# Patient Record
Sex: MALE | Race: OTHER | HISPANIC OR LATINO | URBAN - METROPOLITAN AREA
[De-identification: names, ages, dates, MRNs, and addresses within clinical notes are randomized per-mention and may not be internally consistent; named-entity substitution may affect disease eponyms.]

---

## 2017-04-01 ENCOUNTER — OUTPATIENT (OUTPATIENT)
Dept: OUTPATIENT SERVICES | Facility: HOSPITAL | Age: 52
LOS: 1 days | End: 2017-04-01
Payer: MEDICAID

## 2017-04-17 DIAGNOSIS — R69 ILLNESS, UNSPECIFIED: ICD-10-CM

## 2017-06-01 PROCEDURE — G9001: CPT

## 2017-08-11 ENCOUNTER — INPATIENT (INPATIENT)
Facility: HOSPITAL | Age: 52
LOS: 0 days | Discharge: HOME | End: 2017-08-12
Attending: HOSPITALIST | Admitting: HOSPITALIST
Payer: MEDICAID

## 2017-08-11 VITALS
HEART RATE: 99 BPM | HEIGHT: 70 IN | WEIGHT: 240.08 LBS | TEMPERATURE: 98 F | DIASTOLIC BLOOD PRESSURE: 78 MMHG | OXYGEN SATURATION: 99 % | SYSTOLIC BLOOD PRESSURE: 135 MMHG | RESPIRATION RATE: 16 BRPM

## 2017-08-11 DIAGNOSIS — E66.9 OBESITY, UNSPECIFIED: ICD-10-CM

## 2017-08-11 DIAGNOSIS — E11.9 TYPE 2 DIABETES MELLITUS WITHOUT COMPLICATIONS: ICD-10-CM

## 2017-08-11 DIAGNOSIS — I10 ESSENTIAL (PRIMARY) HYPERTENSION: ICD-10-CM

## 2017-08-11 DIAGNOSIS — E78.5 HYPERLIPIDEMIA, UNSPECIFIED: ICD-10-CM

## 2017-08-11 DIAGNOSIS — S92.309A FRACTURE OF UNSPECIFIED METATARSAL BONE(S), UNSPECIFIED FOOT, INITIAL ENCOUNTER FOR CLOSED FRACTURE: ICD-10-CM

## 2017-08-11 DIAGNOSIS — Z29.9 ENCOUNTER FOR PROPHYLACTIC MEASURES, UNSPECIFIED: ICD-10-CM

## 2017-08-11 LAB
ALBUMIN SERPL ELPH-MCNC: 3.6 G/DL — SIGNIFICANT CHANGE UP (ref 3.3–5)
ALP SERPL-CCNC: 106 U/L — SIGNIFICANT CHANGE UP (ref 40–120)
ALT FLD-CCNC: 48 U/L — SIGNIFICANT CHANGE UP (ref 12–78)
ANION GAP SERPL CALC-SCNC: 9 MMOL/L — SIGNIFICANT CHANGE UP (ref 5–17)
APTT BLD: 31.5 SEC — SIGNIFICANT CHANGE UP (ref 27.5–37.4)
AST SERPL-CCNC: 23 U/L — SIGNIFICANT CHANGE UP (ref 15–37)
BASOPHILS # BLD AUTO: 0.1 K/UL — SIGNIFICANT CHANGE UP (ref 0–0.2)
BASOPHILS NFR BLD AUTO: 1.1 % — SIGNIFICANT CHANGE UP (ref 0–2)
BILIRUB SERPL-MCNC: 0.7 MG/DL — SIGNIFICANT CHANGE UP (ref 0.2–1.2)
BLD GP AB SCN SERPL QL: SIGNIFICANT CHANGE UP
BUN SERPL-MCNC: 19 MG/DL — SIGNIFICANT CHANGE UP (ref 7–23)
CALCIUM SERPL-MCNC: 8.1 MG/DL — LOW (ref 8.5–10.1)
CHLORIDE SERPL-SCNC: 99 MMOL/L — SIGNIFICANT CHANGE UP (ref 96–108)
CO2 SERPL-SCNC: 25 MMOL/L — SIGNIFICANT CHANGE UP (ref 22–31)
CREAT SERPL-MCNC: 0.72 MG/DL — SIGNIFICANT CHANGE UP (ref 0.5–1.3)
EOSINOPHIL # BLD AUTO: 0.2 K/UL — SIGNIFICANT CHANGE UP (ref 0–0.5)
EOSINOPHIL NFR BLD AUTO: 3.3 % — SIGNIFICANT CHANGE UP (ref 0–6)
GLUCOSE SERPL-MCNC: 375 MG/DL — HIGH (ref 70–99)
HCT VFR BLD CALC: 45.2 % — SIGNIFICANT CHANGE UP (ref 39–50)
HGB BLD-MCNC: 15.6 G/DL — SIGNIFICANT CHANGE UP (ref 13–17)
INR BLD: 0.93 RATIO — SIGNIFICANT CHANGE UP (ref 0.88–1.16)
LYMPHOCYTES # BLD AUTO: 1.1 K/UL — SIGNIFICANT CHANGE UP (ref 1–3.3)
LYMPHOCYTES # BLD AUTO: 15.8 % — SIGNIFICANT CHANGE UP (ref 13–44)
MCHC RBC-ENTMCNC: 30.7 PG — SIGNIFICANT CHANGE UP (ref 27–34)
MCHC RBC-ENTMCNC: 34.6 GM/DL — SIGNIFICANT CHANGE UP (ref 32–36)
MCV RBC AUTO: 88.7 FL — SIGNIFICANT CHANGE UP (ref 80–100)
MONOCYTES # BLD AUTO: 0.5 K/UL — SIGNIFICANT CHANGE UP (ref 0–0.9)
MONOCYTES NFR BLD AUTO: 7.2 % — SIGNIFICANT CHANGE UP (ref 2–14)
NEUTROPHILS # BLD AUTO: 5.3 K/UL — SIGNIFICANT CHANGE UP (ref 1.8–7.4)
NEUTROPHILS NFR BLD AUTO: 72.6 % — SIGNIFICANT CHANGE UP (ref 43–77)
PLATELET # BLD AUTO: 175 K/UL — SIGNIFICANT CHANGE UP (ref 150–400)
POTASSIUM SERPL-MCNC: 4.4 MMOL/L — SIGNIFICANT CHANGE UP (ref 3.5–5.3)
POTASSIUM SERPL-SCNC: 4.4 MMOL/L — SIGNIFICANT CHANGE UP (ref 3.5–5.3)
PROT SERPL-MCNC: 7 GM/DL — SIGNIFICANT CHANGE UP (ref 6–8.3)
PROTHROM AB SERPL-ACNC: 10.1 SEC — SIGNIFICANT CHANGE UP (ref 9.8–12.7)
RBC # BLD: 5.09 M/UL — SIGNIFICANT CHANGE UP (ref 4.2–5.8)
RBC # FLD: 13.4 % — SIGNIFICANT CHANGE UP (ref 11–15)
SODIUM SERPL-SCNC: 133 MMOL/L — LOW (ref 135–145)
WBC # BLD: 7.2 K/UL — SIGNIFICANT CHANGE UP (ref 3.8–10.5)
WBC # FLD AUTO: 7.2 K/UL — SIGNIFICANT CHANGE UP (ref 3.8–10.5)

## 2017-08-11 PROCEDURE — 99223 1ST HOSP IP/OBS HIGH 75: CPT

## 2017-08-11 PROCEDURE — 99284 EMERGENCY DEPT VISIT MOD MDM: CPT | Mod: 25

## 2017-08-11 PROCEDURE — 93010 ELECTROCARDIOGRAM REPORT: CPT

## 2017-08-11 PROCEDURE — 71010: CPT | Mod: 26

## 2017-08-11 PROCEDURE — 73630 X-RAY EXAM OF FOOT: CPT | Mod: 26,RT

## 2017-08-11 RX ORDER — DEXTROSE 50 % IN WATER 50 %
25 SYRINGE (ML) INTRAVENOUS ONCE
Qty: 0 | Refills: 0 | Status: DISCONTINUED | OUTPATIENT
Start: 2017-08-11 | End: 2017-08-11

## 2017-08-11 RX ORDER — DEXTROSE 50 % IN WATER 50 %
25 SYRINGE (ML) INTRAVENOUS ONCE
Qty: 0 | Refills: 0 | Status: DISCONTINUED | OUTPATIENT
Start: 2017-08-11 | End: 2017-08-12

## 2017-08-11 RX ORDER — DEXTROSE 50 % IN WATER 50 %
12.5 SYRINGE (ML) INTRAVENOUS ONCE
Qty: 0 | Refills: 0 | Status: DISCONTINUED | OUTPATIENT
Start: 2017-08-11 | End: 2017-08-12

## 2017-08-11 RX ORDER — OXYCODONE AND ACETAMINOPHEN 5; 325 MG/1; MG/1
2 TABLET ORAL EVERY 4 HOURS
Qty: 0 | Refills: 0 | Status: DISCONTINUED | OUTPATIENT
Start: 2017-08-11 | End: 2017-08-12

## 2017-08-11 RX ORDER — GLUCAGON INJECTION, SOLUTION 0.5 MG/.1ML
1 INJECTION, SOLUTION SUBCUTANEOUS ONCE
Qty: 0 | Refills: 0 | Status: DISCONTINUED | OUTPATIENT
Start: 2017-08-11 | End: 2017-08-11

## 2017-08-11 RX ORDER — SODIUM CHLORIDE 9 MG/ML
1000 INJECTION, SOLUTION INTRAVENOUS
Qty: 0 | Refills: 0 | Status: DISCONTINUED | OUTPATIENT
Start: 2017-08-11 | End: 2017-08-11

## 2017-08-11 RX ORDER — HEPARIN SODIUM 5000 [USP'U]/ML
5000 INJECTION INTRAVENOUS; SUBCUTANEOUS EVERY 12 HOURS
Qty: 0 | Refills: 0 | Status: DISCONTINUED | OUTPATIENT
Start: 2017-08-11 | End: 2017-08-12

## 2017-08-11 RX ORDER — DEXTROSE 50 % IN WATER 50 %
1 SYRINGE (ML) INTRAVENOUS ONCE
Qty: 0 | Refills: 0 | Status: DISCONTINUED | OUTPATIENT
Start: 2017-08-11 | End: 2017-08-12

## 2017-08-11 RX ORDER — SODIUM CHLORIDE 9 MG/ML
1000 INJECTION, SOLUTION INTRAVENOUS
Qty: 0 | Refills: 0 | Status: DISCONTINUED | OUTPATIENT
Start: 2017-08-11 | End: 2017-08-12

## 2017-08-11 RX ORDER — CEFAZOLIN SODIUM 1 G
1000 VIAL (EA) INJECTION EVERY 8 HOURS
Qty: 0 | Refills: 0 | Status: DISCONTINUED | OUTPATIENT
Start: 2017-08-11 | End: 2017-08-12

## 2017-08-11 RX ORDER — DEXTROSE 50 % IN WATER 50 %
12.5 SYRINGE (ML) INTRAVENOUS ONCE
Qty: 0 | Refills: 0 | Status: DISCONTINUED | OUTPATIENT
Start: 2017-08-11 | End: 2017-08-11

## 2017-08-11 RX ORDER — OXYCODONE AND ACETAMINOPHEN 5; 325 MG/1; MG/1
2 TABLET ORAL EVERY 4 HOURS
Qty: 0 | Refills: 0 | Status: DISCONTINUED | OUTPATIENT
Start: 2017-08-11 | End: 2017-08-11

## 2017-08-11 RX ORDER — METFORMIN HYDROCHLORIDE 850 MG/1
1 TABLET ORAL
Qty: 0 | Refills: 0 | COMMUNITY

## 2017-08-11 RX ORDER — FENTANYL CITRATE 50 UG/ML
25 INJECTION INTRAVENOUS
Qty: 0 | Refills: 0 | Status: DISCONTINUED | OUTPATIENT
Start: 2017-08-11 | End: 2017-08-11

## 2017-08-11 RX ORDER — GLUCAGON INJECTION, SOLUTION 0.5 MG/.1ML
1 INJECTION, SOLUTION SUBCUTANEOUS ONCE
Qty: 0 | Refills: 0 | Status: DISCONTINUED | OUTPATIENT
Start: 2017-08-11 | End: 2017-08-12

## 2017-08-11 RX ORDER — HEPARIN SODIUM 5000 [USP'U]/ML
5000 INJECTION INTRAVENOUS; SUBCUTANEOUS EVERY 12 HOURS
Qty: 0 | Refills: 0 | Status: DISCONTINUED | OUTPATIENT
Start: 2017-08-11 | End: 2017-08-11

## 2017-08-11 RX ORDER — ACETAMINOPHEN 500 MG
1000 TABLET ORAL ONCE
Qty: 0 | Refills: 0 | Status: COMPLETED | OUTPATIENT
Start: 2017-08-11 | End: 2017-08-11

## 2017-08-11 RX ORDER — SODIUM CHLORIDE 9 MG/ML
1000 INJECTION INTRAMUSCULAR; INTRAVENOUS; SUBCUTANEOUS
Qty: 0 | Refills: 0 | Status: DISCONTINUED | OUTPATIENT
Start: 2017-08-11 | End: 2017-08-11

## 2017-08-11 RX ORDER — GEMFIBROZIL 600 MG
0 TABLET ORAL
Qty: 0 | Refills: 0 | COMMUNITY

## 2017-08-11 RX ORDER — DEXTROSE 50 % IN WATER 50 %
1 SYRINGE (ML) INTRAVENOUS ONCE
Qty: 0 | Refills: 0 | Status: DISCONTINUED | OUTPATIENT
Start: 2017-08-11 | End: 2017-08-11

## 2017-08-11 RX ADMIN — Medication 1000 MILLIGRAM(S): at 17:55

## 2017-08-11 RX ADMIN — Medication 400 MILLIGRAM(S): at 17:53

## 2017-08-11 RX ADMIN — OXYCODONE AND ACETAMINOPHEN 2 TABLET(S): 5; 325 TABLET ORAL at 12:16

## 2017-08-11 RX ADMIN — OXYCODONE AND ACETAMINOPHEN 2 TABLET(S): 5; 325 TABLET ORAL at 11:21

## 2017-08-11 RX ADMIN — OXYCODONE AND ACETAMINOPHEN 2 TABLET(S): 5; 325 TABLET ORAL at 20:33

## 2017-08-11 RX ADMIN — SODIUM CHLORIDE 75 MILLILITER(S): 9 INJECTION, SOLUTION INTRAVENOUS at 17:53

## 2017-08-11 RX ADMIN — OXYCODONE AND ACETAMINOPHEN 2 TABLET(S): 5; 325 TABLET ORAL at 21:33

## 2017-08-11 RX ADMIN — Medication 100 MILLIGRAM(S): at 21:33

## 2017-08-11 RX ADMIN — SODIUM CHLORIDE 100 MILLILITER(S): 9 INJECTION INTRAMUSCULAR; INTRAVENOUS; SUBCUTANEOUS at 11:27

## 2017-08-11 NOTE — ED ADULT NURSE NOTE - OBJECTIVE STATEMENT
Referred to the emergency department for possible surgery Injury at work heavy metal fell on his foot 2 days ago Fracturing 2nd and 3rd toe on the right foot. Bruising to the top of the right foot. He has a history of DM, and high cholesterol. He is on medications  which he took this am. He had breakfast

## 2017-08-11 NOTE — ED PROVIDER NOTE - MEDICAL DECISION MAKING DETAILS
R foot pain after heavy object fell on it; R foot xray, pain control, reassess R foot pain after heavy object fell on it; R foot xray, pain control, reassess  seen by Dr. palacios(podiatry) in the ED, plan to take him to the OR.  pre-op labs sent, admit to hospitalist team

## 2017-08-11 NOTE — H&P ADULT - ASSESSMENT
50 y/o male  with oral medication controlled type 2 diabetes, HTN, and Obesity presents with right foot  second and third metatarsal fractures To OR for repair     Medically clear for procedure no obvious risk factors for a low risk procedure

## 2017-08-11 NOTE — H&P ADULT - NSHPLABSRESULTS_GEN_ALL_CORE
15.6   7.2   )-----------( 175      ( 11 Aug 2017 08:30 )             45.2     08-11    133<L>  |  99  |  19  ----------------------------<  375<H>  4.4   |  25  |  0.72    Ca    8.1<L>      11 Aug 2017 08:30    TPro  7.0  /  Alb  3.6  /  TBili  0.7  /  DBili  x   /  AST  23  /  ALT  48  /  AlkPhos  106  08-11    PT/INR - ( 11 Aug 2017 08:30 )   PT: 10.1 sec;   INR: 0.93 ratio         PTT - ( 11 Aug 2017 08:30 )  PTT:31.5 sec    < from: Xray Foot AP + Lateral + Oblique, Right (08.11.17 @ 08:42) >      EXAM:  FOOT COMPLETE  3 VWS  RT                            PROCEDURE DATE:  08/11/2017          INTERPRETATION:  PROCEDURE: AP, lateral, and oblique views of the right   foot.    CLINICAL INFORMATION: Right foot pain.    No prior studies are available for comparison.    FINDINGS:    There are oblique fractures through the distal aspects of the right   second and third metatarsals. The Lisfranc alignment is intact. There is   no dislocation.    IMPRESSION:    Right second and third metatarsal fractures.    < end of copied text >

## 2017-08-11 NOTE — H&P ADULT - PROBLEM SELECTOR PLAN 1
Appreciate podiatry management   -check ekg and CXR   -NPO an IV fluids   -To OR  -pain control with percocet   -PT post op

## 2017-08-11 NOTE — H&P ADULT - PMH
Essential hypertension    Hyperlipidemia, unspecified hyperlipidemia type    Obesity (BMI 30.0-34.9)    Type 2 diabetes mellitus without complication, without long-term current use of insulin

## 2017-08-11 NOTE — ED PROVIDER NOTE - PHYSICAL EXAMINATION
Gen: well appearing, in no distress  HEENT: no evidence of head trauma  CV: RRR; no m/g/r  lungs; CTAB: no w/r/r  abd: soft, nd, nt  ext: 2+ DP/PT pulses in RLE; + ttp of R mid foot; nl sensation and motor;

## 2017-08-11 NOTE — ED ADULT TRIAGE NOTE - CHIEF COMPLAINT QUOTE
sent DR Ochoa for fracture to 2nd and 3rd metatarsal of right foot, due from injury yesterday. Possible surgery today.

## 2017-08-11 NOTE — H&P ADULT - HISTORY OF PRESENT ILLNESS
· HPI Objective Statement: Pertinent PMH/PSH/FHx/SHx and Review of Systems contained within:  52 y/o M w hx of DM, presents w R foot pain; reports a heavy object fell on his R foot while at work 2 days ago, went to see his PMD yesterday, and referred to ED due to metatarsal fracture; patient able to ambulate; denies any other injuries or complaints on ROS  PMH: DM meds: metformin; gemfibrozil; januvia; lyrica; enalapril allergies: nkda SH: current smooker cig or drug use

## 2017-08-12 ENCOUNTER — TRANSCRIPTION ENCOUNTER (OUTPATIENT)
Age: 52
End: 2017-08-12

## 2017-08-12 VITALS
RESPIRATION RATE: 17 BRPM | SYSTOLIC BLOOD PRESSURE: 142 MMHG | HEART RATE: 84 BPM | DIASTOLIC BLOOD PRESSURE: 84 MMHG | TEMPERATURE: 98 F | OXYGEN SATURATION: 98 %

## 2017-08-12 LAB
ALBUMIN SERPL ELPH-MCNC: 3.2 G/DL — LOW (ref 3.3–5)
ALP SERPL-CCNC: 85 U/L — SIGNIFICANT CHANGE UP (ref 40–120)
ALT FLD-CCNC: 41 U/L — SIGNIFICANT CHANGE UP (ref 12–78)
ANION GAP SERPL CALC-SCNC: 8 MMOL/L — SIGNIFICANT CHANGE UP (ref 5–17)
AST SERPL-CCNC: 25 U/L — SIGNIFICANT CHANGE UP (ref 15–37)
BILIRUB SERPL-MCNC: 0.5 MG/DL — SIGNIFICANT CHANGE UP (ref 0.2–1.2)
BUN SERPL-MCNC: 11 MG/DL — SIGNIFICANT CHANGE UP (ref 7–23)
CALCIUM SERPL-MCNC: 7.8 MG/DL — LOW (ref 8.5–10.1)
CHLORIDE SERPL-SCNC: 102 MMOL/L — SIGNIFICANT CHANGE UP (ref 96–108)
CHOLEST SERPL-MCNC: 155 MG/DL — SIGNIFICANT CHANGE UP (ref 10–199)
CO2 SERPL-SCNC: 27 MMOL/L — SIGNIFICANT CHANGE UP (ref 22–31)
CREAT SERPL-MCNC: 0.44 MG/DL — LOW (ref 0.5–1.3)
GLUCOSE SERPL-MCNC: 223 MG/DL — HIGH (ref 70–99)
HBA1C BLD-MCNC: 11.7 % — HIGH (ref 4–5.6)
HCT VFR BLD CALC: 42.9 % — SIGNIFICANT CHANGE UP (ref 39–50)
HDLC SERPL-MCNC: 18 MG/DL — LOW (ref 40–125)
HGB BLD-MCNC: 14.1 G/DL — SIGNIFICANT CHANGE UP (ref 13–17)
LIPID PNL WITH DIRECT LDL SERPL: SIGNIFICANT CHANGE UP
MCHC RBC-ENTMCNC: 29 PG — SIGNIFICANT CHANGE UP (ref 27–34)
MCHC RBC-ENTMCNC: 32.8 GM/DL — SIGNIFICANT CHANGE UP (ref 32–36)
MCV RBC AUTO: 88.2 FL — SIGNIFICANT CHANGE UP (ref 80–100)
PLATELET # BLD AUTO: 164 K/UL — SIGNIFICANT CHANGE UP (ref 150–400)
POTASSIUM SERPL-MCNC: 3.8 MMOL/L — SIGNIFICANT CHANGE UP (ref 3.5–5.3)
POTASSIUM SERPL-SCNC: 3.8 MMOL/L — SIGNIFICANT CHANGE UP (ref 3.5–5.3)
PROT SERPL-MCNC: 6.5 GM/DL — SIGNIFICANT CHANGE UP (ref 6–8.3)
RBC # BLD: 4.87 M/UL — SIGNIFICANT CHANGE UP (ref 4.2–5.8)
RBC # FLD: 13 % — SIGNIFICANT CHANGE UP (ref 11–15)
SODIUM SERPL-SCNC: 137 MMOL/L — SIGNIFICANT CHANGE UP (ref 135–145)
TOTAL CHOLESTEROL/HDL RATIO MEASUREMENT: 8.6 RATIO — SIGNIFICANT CHANGE UP (ref 3.4–9.6)
TRIGL SERPL-MCNC: 788 MG/DL — HIGH (ref 10–149)
WBC # BLD: 7.8 K/UL — SIGNIFICANT CHANGE UP (ref 3.8–10.5)
WBC # FLD AUTO: 7.8 K/UL — SIGNIFICANT CHANGE UP (ref 3.8–10.5)

## 2017-08-12 PROCEDURE — 73630 X-RAY EXAM OF FOOT: CPT | Mod: 26,RT

## 2017-08-12 PROCEDURE — 99239 HOSP IP/OBS DSCHRG MGMT >30: CPT

## 2017-08-12 RX ORDER — ENOXAPARIN SODIUM 100 MG/ML
40 INJECTION SUBCUTANEOUS
Qty: 14 | Refills: 0 | OUTPATIENT
Start: 2017-08-12 | End: 2017-08-26

## 2017-08-12 RX ORDER — OXYCODONE HYDROCHLORIDE 5 MG/1
2 TABLET ORAL
Qty: 40 | Refills: 0 | OUTPATIENT
Start: 2017-08-12 | End: 2017-08-17

## 2017-08-12 RX ADMIN — HEPARIN SODIUM 5000 UNIT(S): 5000 INJECTION INTRAVENOUS; SUBCUTANEOUS at 05:55

## 2017-08-12 RX ADMIN — OXYCODONE AND ACETAMINOPHEN 2 TABLET(S): 5; 325 TABLET ORAL at 06:54

## 2017-08-12 RX ADMIN — Medication 100 MILLIGRAM(S): at 05:55

## 2017-08-12 RX ADMIN — OXYCODONE AND ACETAMINOPHEN 2 TABLET(S): 5; 325 TABLET ORAL at 05:54

## 2017-08-12 NOTE — PHYSICAL THERAPY INITIAL EVALUATION ADULT - ADDITIONAL COMMENTS
As per pt he lives in a private house with his wife. Three stairs to enter the home without handrails, and 13 steps inside with Right ascending handrails. Pt prior was I without AD

## 2017-08-12 NOTE — DISCHARGE NOTE ADULT - PATIENT PORTAL LINK FT
“You can access the [facility] Patient Portal, offered by [facility], by registering with the following website: [email or web address]”

## 2017-08-12 NOTE — PHYSICAL THERAPY INITIAL EVALUATION ADULT - GAIT DEVIATIONS NOTED, PT EVAL
decreased step length/decreased weight-shifting ability/decreased stride length/increased time in double stance/decreased bernie

## 2017-08-12 NOTE — PROGRESS NOTE ADULT - ASSESSMENT
52 yo M s/p RF ORIF 2nd and 3rd metatarsals  -Pain controlled with percocet, cont using outpt  -keep cast clean, dry, and intact  -strict NWB to R foot  -No need for outpt abx  -Rec lovenox 40 mg once daily for 14 days on discharge  -pod stable for dc once PT evaluates pt  -d/w attending

## 2017-08-12 NOTE — DISCHARGE NOTE ADULT - MEDICATION SUMMARY - MEDICATIONS TO TAKE
I will START or STAY ON the medications listed below when I get home from the hospital:    physical therapy   -- Patient to have physical therapy 2-3 x aweek right foot fracture   -- Indication: For To get stronger    acetaminophen-oxycodone 325 mg-5 mg oral tablet  -- 2 tab(s) by mouth every 6 hours, As Needed, Moderate Pain (4 - 6) MDD:8 tablets daily  -- Indication: For Pain    enoxaparin 40 mg/0.4 mL injectable solution  -- 40 milligram(s) injectable once a day  -- It is very important that you take or use this exactly as directed.  Do not skip doses or discontinue unless directed by your doctor.    -- Indication: For To stop blood clots     metFORMIN 500 mg oral tablet  -- 1 tab(s) by mouth 2 times a day  -- Indication: For Type 2 diabetes mellitus without complication, without long-term current use of insulin    Januvia  --  by mouth once a day  -- Indication: For Type 2 diabetes mellitus without complication, without long-term current use of insulin    pravastatin  --  by mouth once a day  -- Indication: For cholesterol    gemfibrozil 600 mg oral tablet  --  by mouth once a day  -- Indication: For cholesterol

## 2017-08-12 NOTE — DISCHARGE NOTE ADULT - CARE PLAN
Principal Discharge DX:	Metatarsal bone fracture  Goal:	please follow e up with podiatry and your  PMD  Instructions for follow-up, activity and diet:	please  follow up with podiatry

## 2017-08-12 NOTE — DISCHARGE NOTE ADULT - HOSPITAL COURSE
Chief Complaint/Reason for Admission: right foot pain	  History of Present Illness: 	  · HPI Objective Statement: Pertinent PMH/PSH/FHx/SHx and Review of Systems contained within:  52 y/o M w hx of DM, presents w R foot pain; reports a heavy object fell on his R foot while at work 2 days ago, went to see his PMD yesterday, and referred to ED due to metatarsal fracture; patient able to ambulate; denies any other injuries or complaints on ROS  PMH: DM meds: metformin; gemfibrozil; januvia; lyrica; enalapril allergies: nkda SH: current smooker cig or drug use	    < from: Xray Foot AP + Lateral + Oblique, Right (08.12.17 @ 08:32) >  EXAM:  FOOT COMPLETE  3 VWS  RT                            PROCEDURE DATE:  08/12/2017          INTERPRETATION:  Right foot    Indication: Postop foot surgery, assess alignment    Comparison: 8/11    IMPRESSION: 2 views demonstrate baseline exam status post sideplate and   screw fixation of the second and third metatarsal osteotomies in good   alignment in cast.      < end of copied text >    RADIOLOGY & ADDITIONAL TESTS:      Assessment and Plan:   · Assessment		  52 yo M s/p RF ORIF 2nd and 3rd metatarsals  -Pain controlled with percocet, cont using outpt  -keep cast clean, dry, and intact  -strict NWB to R foot  -No need for outpt abx  -Rec lovenox 40 mg once daily for 14 days on discharge  -pod stable for dc once PT evaluates pt  -d/w attending

## 2017-08-12 NOTE — DISCHARGE NOTE ADULT - CARE PROVIDER_API CALL
Fidencio Pugh (DPKRISTY), Surgery  [address] [place] [address]  [address]  [place] [address]  Phone: [phone]  Fax: [phone]

## 2017-08-12 NOTE — PHYSICAL THERAPY INITIAL EVALUATION ADULT - CRITERIA FOR SKILLED THERAPEUTIC INTERVENTIONS
functional limitations in following categories/impairments found/anticipated discharge recommendation/anticipated equipment needs at discharge

## 2017-08-12 NOTE — PHYSICAL THERAPY INITIAL EVALUATION ADULT - MODIFIED CLINICAL TEST OF SENSORY INTEGRATION IN BALANCE TEST
Barthel Index: Feeding Score _10__, Bathing Score _0__, Grooming Score __5_, Dressing Score _10__, Bowels Score _10__, Bladder Score __10_, Toilet Score _10__, Transfers Score _10__, Mobility Score _0__, Stairs Score __5_,     Total Score _70__

## 2017-08-18 ENCOUNTER — TRANSCRIPTION ENCOUNTER (OUTPATIENT)
Age: 52
End: 2017-08-18
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
## 2017-08-21 DIAGNOSIS — E78.5 HYPERLIPIDEMIA, UNSPECIFIED: ICD-10-CM

## 2017-08-21 DIAGNOSIS — E11.9 TYPE 2 DIABETES MELLITUS WITHOUT COMPLICATIONS: ICD-10-CM

## 2017-08-21 DIAGNOSIS — S92.321A DISPLACED FRACTURE OF SECOND METATARSAL BONE, RIGHT FOOT, INITIAL ENCOUNTER FOR CLOSED FRACTURE: ICD-10-CM
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
## 2017-08-21 DIAGNOSIS — Z79.84 LONG TERM (CURRENT) USE OF ORAL HYPOGLYCEMIC DRUGS: ICD-10-CM

## 2017-08-21 DIAGNOSIS — E66.9 OBESITY, UNSPECIFIED: ICD-10-CM

## 2017-08-21 DIAGNOSIS — S92.331A DISPLACED FRACTURE OF THIRD METATARSAL BONE, RIGHT FOOT, INITIAL ENCOUNTER FOR CLOSED FRACTURE: ICD-10-CM

## 2017-08-21 DIAGNOSIS — F17.210 NICOTINE DEPENDENCE, CIGARETTES, UNCOMPLICATED: ICD-10-CM

## 2017-08-22 ENCOUNTER — APPOINTMENT (OUTPATIENT)
Dept: INTERNAL MEDICINE | Facility: CLINIC | Age: 52
End: 2017-08-22

## 2017-09-03 ENCOUNTER — EMERGENCY (EMERGENCY)
Facility: HOSPITAL | Age: 52
LOS: 0 days | Discharge: LEFT BEFORE DISCHARGE | End: 2017-09-03
Attending: EMERGENCY MEDICINE
Payer: MEDICAID

## 2017-09-03 VITALS
TEMPERATURE: 98 F | OXYGEN SATURATION: 98 % | DIASTOLIC BLOOD PRESSURE: 73 MMHG | SYSTOLIC BLOOD PRESSURE: 151 MMHG | HEIGHT: 70 IN | RESPIRATION RATE: 18 BRPM | WEIGHT: 240.08 LBS | HEART RATE: 106 BPM

## 2017-09-03 DIAGNOSIS — Z48.02 ENCOUNTER FOR REMOVAL OF SUTURES: ICD-10-CM

## 2017-09-03 DIAGNOSIS — Z98.890 OTHER SPECIFIED POSTPROCEDURAL STATES: Chronic | ICD-10-CM

## 2017-09-03 PROCEDURE — 99282 EMERGENCY DEPT VISIT SF MDM: CPT

## 2017-09-03 NOTE — ED PROVIDER NOTE - PRESENTING SYMPTOMS DETAILS
51M recent ORIF of R 2nd/3rd metatarsal about a month ago  comes in for suture removal. states missed his appt. no fever/chills/obvious discharge, no injuries  ROS: No fever/chills, No photophobia/eye pain/changes in vision, No ear pain/sore throat/dysphagia, No chest pain/palpitations, no SOB/cough/wheeze/stridor, No abdominal pain/N/V/D, no dysuria/frequency/discharge, No neck/back pain, no rash, no changes in neurological status/function.

## 2017-09-03 NOTE — ED PROVIDER NOTE - PHYSICAL EXAMINATION
GEN: Alert, NAD  HEAD: atraumatic, EOMI, PERRL, normal lids/conjunctiva  ENT: normal hearing, patent oropharynx without erythema/exudate, uvula midline  NECK: +supple, no tenderness/meningismus, +Trachea midline  PULM: Bilateral BS, normal resp effort, no wheeze/stridor/retractions  CV: RRR, no M/R/G, +dist ext pulses  ABD: soft, NT/ND  BACK: no CVAT, no midline tenderness  MSK: no edema/erythema/cyanosis  SKIN: no rash  NEURO: AAOx3, no sensory/motor deficits, CN 2-12 intact  R foot in cast, no obvious erythema seen around his toes  sutures under his cast

## 2017-09-03 NOTE — ED ADULT NURSE NOTE - CARDIO ASSESSMENT
Post stent cardiac rehab education completed with patient. Stent card given. Patient to go to [facility] cardiac rehab.   Contact information placed in AVS.
---

## 2017-09-03 NOTE — ED PROVIDER NOTE - MEDICAL DECISION MAKING DETAILS
pt states he feels impatient and doesn't want to wait for podiatry to call back. recommend f/u w his surgeon. pt appears well and non-toxic. . VSS. Sx have improved during ED stay. No acute events during ED observation period. Precautions given to return to the ED if sx persist or change. Pt expresses understanding and has no further questions. Pt feels comfortable and wishes to be discharged home. Instructed to follow up with PMD in 24 hrs. podiatry
Never

## 2017-10-03 ENCOUNTER — APPOINTMENT (OUTPATIENT)
Dept: INTERNAL MEDICINE | Facility: CLINIC | Age: 52
End: 2017-10-03

## 2017-10-31 ENCOUNTER — APPOINTMENT (OUTPATIENT)
Dept: INTERNAL MEDICINE | Facility: CLINIC | Age: 52
End: 2017-10-31
Payer: MEDICAID

## 2017-10-31 VITALS — SYSTOLIC BLOOD PRESSURE: 106 MMHG | RESPIRATION RATE: 14 BRPM | HEART RATE: 112 BPM | DIASTOLIC BLOOD PRESSURE: 70 MMHG

## 2017-10-31 VITALS — BODY MASS INDEX: 35.07 KG/M2 | HEIGHT: 70 IN | WEIGHT: 245 LBS

## 2017-10-31 DIAGNOSIS — Z72.3 LACK OF PHYSICAL EXERCISE: ICD-10-CM

## 2017-10-31 DIAGNOSIS — Z83.3 FAMILY HISTORY OF DIABETES MELLITUS: ICD-10-CM

## 2017-10-31 DIAGNOSIS — F15.90 OTHER STIMULANT USE, UNSPECIFIED, UNCOMPLICATED: ICD-10-CM

## 2017-10-31 PROCEDURE — 36415 COLL VENOUS BLD VENIPUNCTURE: CPT

## 2017-10-31 PROCEDURE — 99406 BEHAV CHNG SMOKING 3-10 MIN: CPT

## 2017-10-31 PROCEDURE — 93000 ELECTROCARDIOGRAM COMPLETE: CPT

## 2017-10-31 PROCEDURE — 99204 OFFICE O/P NEW MOD 45 MIN: CPT | Mod: 25

## 2017-11-02 DIAGNOSIS — Z00.00 ENCOUNTER FOR GENERAL ADULT MEDICAL EXAMINATION W/OUT ABNORMAL FINDINGS: ICD-10-CM

## 2017-11-02 LAB
ALBUMIN SERPL ELPH-MCNC: 4.3 G/DL
ALP BLD-CCNC: 125 U/L
ALT SERPL-CCNC: 24 U/L
ANION GAP SERPL CALC-SCNC: 16 MMOL/L
APPEARANCE: CLEAR
AST SERPL-CCNC: 24 U/L
BASOPHILS # BLD AUTO: 0.04 K/UL
BASOPHILS NFR BLD AUTO: 0.6 %
BILIRUB SERPL-MCNC: 0.4 MG/DL
BILIRUBIN URINE: NEGATIVE
BLOOD URINE: NEGATIVE
BUN SERPL-MCNC: 16 MG/DL
CALCIUM SERPL-MCNC: 9.8 MG/DL
CHLORIDE SERPL-SCNC: 92 MMOL/L
CHOLEST SERPL-MCNC: 171 MG/DL
CHOLEST/HDLC SERPL: 12.2 RATIO
CO2 SERPL-SCNC: 24 MMOL/L
COLOR: YELLOW
CREAT SERPL-MCNC: 0.96 MG/DL
CREAT SPEC-SCNC: 70 MG/DL
EOSINOPHIL # BLD AUTO: 0.2 K/UL
EOSINOPHIL NFR BLD AUTO: 2.9 %
GLUCOSE QUALITATIVE U: 1000 MG/DL
GLUCOSE SERPL-MCNC: 467 MG/DL
HBA1C MFR BLD HPLC: 10.5 %
HCT VFR BLD CALC: 44.1 %
HDLC SERPL-MCNC: 14 MG/DL
HGB BLD-MCNC: 15 G/DL
IMM GRANULOCYTES NFR BLD AUTO: 0.3 %
KETONES URINE: NEGATIVE
LDLC SERPL CALC-MCNC: NORMAL
LEUKOCYTE ESTERASE URINE: NEGATIVE
LYMPHOCYTES # BLD AUTO: 1.23 K/UL
LYMPHOCYTES NFR BLD AUTO: 17.6 %
MAN DIFF?: NORMAL
MCHC RBC-ENTMCNC: 28.7 PG
MCHC RBC-ENTMCNC: 34 GM/DL
MCV RBC AUTO: 84.5 FL
MICROALBUMIN 24H UR DL<=1MG/L-MCNC: 5 MG/DL
MICROALBUMIN/CREAT 24H UR-RTO: 71 MG/G
MONOCYTES # BLD AUTO: 0.49 K/UL
MONOCYTES NFR BLD AUTO: 7 %
NEUTROPHILS # BLD AUTO: 5 K/UL
NEUTROPHILS NFR BLD AUTO: 71.6 %
NITRITE URINE: NEGATIVE
PH URINE: 5
PLATELET # BLD AUTO: 274 K/UL
POTASSIUM SERPL-SCNC: 4.9 MMOL/L
PROT SERPL-MCNC: 7.6 G/DL
PROTEIN URINE: NEGATIVE MG/DL
RBC # BLD: 5.22 M/UL
RBC # FLD: 14.6 %
SODIUM SERPL-SCNC: 132 MMOL/L
SPECIFIC GRAVITY URINE: 1.04
TRIGL SERPL-MCNC: 1280 MG/DL
TSH SERPL-ACNC: 1.31 UIU/ML
UROBILINOGEN URINE: NEGATIVE MG/DL
WBC # FLD AUTO: 6.98 K/UL

## 2017-11-03 LAB — PSA SERPL-MCNC: 0.57 NG/ML

## 2017-12-01 ENCOUNTER — APPOINTMENT (OUTPATIENT)
Dept: INTERNAL MEDICINE | Facility: CLINIC | Age: 52
End: 2017-12-01

## 2018-01-01 ENCOUNTER — OUTPATIENT (OUTPATIENT)
Dept: OUTPATIENT SERVICES | Facility: HOSPITAL | Age: 53
LOS: 1 days | End: 2018-01-01
Payer: MEDICAID

## 2018-01-01 DIAGNOSIS — Z98.890 OTHER SPECIFIED POSTPROCEDURAL STATES: Chronic | ICD-10-CM

## 2018-01-01 PROCEDURE — G9001: CPT

## 2018-01-17 DIAGNOSIS — R69 ILLNESS, UNSPECIFIED: ICD-10-CM

## 2018-02-22 ENCOUNTER — APPOINTMENT (OUTPATIENT)
Dept: INTERNAL MEDICINE | Facility: CLINIC | Age: 53
End: 2018-02-22
Payer: MEDICAID

## 2018-02-22 VITALS — WEIGHT: 237 LBS | BODY MASS INDEX: 33.93 KG/M2 | HEIGHT: 70 IN

## 2018-02-22 VITALS — DIASTOLIC BLOOD PRESSURE: 74 MMHG | RESPIRATION RATE: 16 BRPM | HEART RATE: 100 BPM | SYSTOLIC BLOOD PRESSURE: 110 MMHG

## 2018-02-22 PROCEDURE — 99214 OFFICE O/P EST MOD 30 MIN: CPT | Mod: 25

## 2018-02-22 PROCEDURE — 99406 BEHAV CHNG SMOKING 3-10 MIN: CPT

## 2018-02-22 PROCEDURE — 90686 IIV4 VACC NO PRSV 0.5 ML IM: CPT

## 2018-02-22 PROCEDURE — G0008: CPT

## 2018-03-22 ENCOUNTER — APPOINTMENT (OUTPATIENT)
Dept: INTERNAL MEDICINE | Facility: CLINIC | Age: 53
End: 2018-03-22

## 2018-03-23 ENCOUNTER — APPOINTMENT (OUTPATIENT)
Dept: ENDOCRINOLOGY | Facility: CLINIC | Age: 53
End: 2018-03-23

## 2018-04-09 ENCOUNTER — RX RENEWAL (OUTPATIENT)
Age: 53
End: 2018-04-09

## 2018-04-23 ENCOUNTER — APPOINTMENT (OUTPATIENT)
Dept: INTERNAL MEDICINE | Facility: CLINIC | Age: 53
End: 2018-04-23

## 2018-07-22 ENCOUNTER — APPOINTMENT (OUTPATIENT)
Dept: INTERNAL MEDICINE | Facility: CLINIC | Age: 53
End: 2018-07-22

## 2018-08-08 PROBLEM — E78.5 HYPERLIPIDEMIA, UNSPECIFIED: Chronic | Status: ACTIVE | Noted: 2017-08-11

## 2018-08-08 PROBLEM — E11.9 TYPE 2 DIABETES MELLITUS WITHOUT COMPLICATIONS: Chronic | Status: ACTIVE | Noted: 2017-08-11

## 2018-08-08 PROBLEM — E66.9 OBESITY, UNSPECIFIED: Chronic | Status: ACTIVE | Noted: 2017-08-11

## 2018-08-08 PROBLEM — I10 ESSENTIAL (PRIMARY) HYPERTENSION: Chronic | Status: ACTIVE | Noted: 2017-08-11

## 2018-08-19 ENCOUNTER — APPOINTMENT (OUTPATIENT)
Dept: INTERNAL MEDICINE | Facility: CLINIC | Age: 53
End: 2018-08-19
Payer: MEDICAID

## 2018-08-19 VITALS — RESPIRATION RATE: 16 BRPM | DIASTOLIC BLOOD PRESSURE: 78 MMHG | SYSTOLIC BLOOD PRESSURE: 122 MMHG | HEART RATE: 104 BPM

## 2018-08-19 VITALS — HEIGHT: 70 IN | BODY MASS INDEX: 33.21 KG/M2 | WEIGHT: 232 LBS

## 2018-08-19 DIAGNOSIS — L91.8 OTHER HYPERTROPHIC DISORDERS OF THE SKIN: ICD-10-CM

## 2018-08-19 PROCEDURE — 90732 PPSV23 VACC 2 YRS+ SUBQ/IM: CPT

## 2018-08-19 PROCEDURE — 99406 BEHAV CHNG SMOKING 3-10 MIN: CPT

## 2018-08-19 PROCEDURE — G0009: CPT

## 2018-08-19 PROCEDURE — 99214 OFFICE O/P EST MOD 30 MIN: CPT | Mod: 25

## 2018-08-19 PROCEDURE — 36415 COLL VENOUS BLD VENIPUNCTURE: CPT

## 2018-08-19 NOTE — HISTORY OF PRESENT ILLNESS
[FreeTextEntry1] : DM, neuropathy, hld, ED, leg/back pain [de-identified] : Pt is a 51 y/o male with a hx of DM, hld, neuropathy, ED, leg and back pain. \par He has been on medications for the DM - metformin, glipizide, januvia. On lipid meds - atorvastatin and gemfibrozil. On lyrica and amitriptyline for the neuropathy. \par \par Reports that he has run out of meds.  Has not been taking for a couple of weeks.\par diet needs improvement - Has been variable.\par Exercise- does walking at work - not consistent\par Wt gain\par Has checked FS at home.  Not lower that 170 with or without the meds.\par overdue for ophtho - has not yet made appt.\par Still with early waking, Was better with the amitriptyline \par Still with neuropathic sx. follows with Neurology - Dr Manish Holguin, also follows with pod- Dr Alicia - Reports that the neuropathic sx include pain at the leg and numbness at the toes. + burning at the feet and the back with some pinching. Pain at the toes has increased.  Had no relief with gabapentin - controlled with the lyrica  Some pinching sensation at the hands - s/p EMG.  Reports that he had been given oxy by other MD.\par + still with pain at the back.  s/p injection with relief.  \par Now with pain at the neck and at the shoulder no noted new UE weakness or numbness\par Reports no polyuria, polydipsia. with nocturia x3. no polyphagia. \par no noted CV sx. \par with some itching in the inguinal region.  no rash.\par no GI sx.\par no noted dizziness. no confusion.\par s/p cut at the rt 1st finger.  Still with pain and erythema\par \par Still smoking - reports up to 4-5 a day.  no change.  Wants to see if he can stop by himself.  Does not want Rx.  \par \par Tdap - '17\par pneumo ??\par has not yet had flu vaccine\par \par ophtho - due - Dr Ocasio\par colon - due to start.\par prost- 10/17.

## 2018-08-19 NOTE — PHYSICAL EXAM
[No Acute Distress] : no acute distress [Well Nourished] : well nourished [Well Developed] : well developed [Well-Appearing] : well-appearing [Normal Sclera/Conjunctiva] : normal sclera/conjunctiva [PERRL] : pupils equal round and reactive to light [EOMI] : extraocular movements intact [Normal Outer Ear/Nose] : the outer ears and nose were normal in appearance [Normal Oropharynx] : the oropharynx was normal [No JVD] : no jugular venous distention [Supple] : supple [No Lymphadenopathy] : no lymphadenopathy [Thyroid Normal, No Nodules] : the thyroid was normal and there were no nodules present [No Respiratory Distress] : no respiratory distress  [Clear to Auscultation] : lungs were clear to auscultation bilaterally [No Accessory Muscle Use] : no accessory muscle use [Normal Rate] : normal rate  [Regular Rhythm] : with a regular rhythm [Normal S1, S2] : normal S1 and S2 [No Murmur] : no murmur heard [No Carotid Bruits] : no carotid bruits [Pedal Pulses Present] : the pedal pulses are present [No Edema] : there was no peripheral edema [Soft] : abdomen soft [Non Tender] : non-tender [Non-distended] : non-distended [No Masses] : no abdominal mass palpated [No HSM] : no HSM [Normal Bowel Sounds] : normal bowel sounds [Normal Posterior Cervical Nodes] : no posterior cervical lymphadenopathy [Normal Anterior Cervical Nodes] : no anterior cervical lymphadenopathy [No CVA Tenderness] : no CVA  tenderness [No Spinal Tenderness] : no spinal tenderness [No Joint Swelling] : no joint swelling [Grossly Normal Strength/Tone] : grossly normal strength/tone [Normal Gait] : normal gait [Coordination Grossly Intact] : coordination grossly intact [No Focal Deficits] : no focal deficits [Speech Grossly Normal] : speech grossly normal [Memory Grossly Normal] : memory grossly normal [Normal Affect] : the affect was normal [Alert and Oriented x3] : oriented to person, place, and time [Normal Mood] : the mood was normal [Normal Insight/Judgement] : insight and judgment were intact [Right Foot Was Examined] : Right foot ~C was examined [Left Foot Was Examined] : left foot ~C was examined [None] : no ulcers in either foot were found [] : both feet [de-identified] : lt shoulder tenderness at the bicepual groove, + tenderness at the neck. [de-identified] : Swelling and tenderness at the rt 2nd pip joint

## 2018-08-19 NOTE — REVIEW OF SYSTEMS
[Recent Change In Weight] : ~T recent weight change [Nocturia] : nocturia [Impotence] : impotence [Itching] : itching [Insomnia] : insomnia [Negative] : Heme/Lymph [Fever] : no fever [Chills] : no chills [Fatigue] : no fatigue [Hot Flashes] : no hot flashes [Night Sweats] : no night sweats [Dysuria] : no dysuria [Incontinence] : no incontinence [Hematuria] : no hematuria [Frequency] : no frequency [Skin Rash] : no skin rash [Suicidal] : not suicidal [Anxiety] : no anxiety [Depression] : no depression [FreeTextEntry2] : wt gain [FreeTextEntry9] : back and leg pain, hand pain, neck and shoulder pains [de-identified] : numbness as noted - neuropathy

## 2018-08-19 NOTE — COUNSELING
[Weight management counseling provided] : Weight management [Healthy eating counseling provided] : healthy eating [Activity counseling provided] : activity [Smoking cessation counseling provided] : smoking cessation [Low Fat Diet] : Low fat diet [Decrease Portions] : Decrease food portions [Low Salt Diet] : Low salt diet [Walking] : Walking [Quit Smoking] : Quit smoking [Patient Non-adherent to care plan] : Patient non-adherent to care plan [None] : None [Good understanding] : Patient has a good understanding of lifestyle changes and the steps needed to achieve self management goals [Tobacco Use Cessation Intermediate Greater Than 3 Minutes Up to 10 Minutes] : Tobacco Use Cessation Intermediate Greater Than 3 Minutes Up to 10 Minutes

## 2018-08-20 LAB
ALBUMIN SERPL ELPH-MCNC: 4.5 G/DL
ALP BLD-CCNC: 104 U/L
ALT SERPL-CCNC: 17 U/L
ANION GAP SERPL CALC-SCNC: 15 MMOL/L
AST SERPL-CCNC: 32 U/L
BASOPHILS # BLD AUTO: 0.03 K/UL
BASOPHILS NFR BLD AUTO: 0.4 %
BILIRUB SERPL-MCNC: 0.4 MG/DL
BUN SERPL-MCNC: 15 MG/DL
CALCIUM SERPL-MCNC: 9.3 MG/DL
CHLORIDE SERPL-SCNC: 94 MMOL/L
CHOLEST SERPL-MCNC: 328 MG/DL
CHOLEST/HDLC SERPL: 23.4 RATIO
CO2 SERPL-SCNC: 23 MMOL/L
CREAT SERPL-MCNC: 0.51 MG/DL
CREAT SPEC-SCNC: 188 MG/DL
EOSINOPHIL # BLD AUTO: 0.24 K/UL
EOSINOPHIL NFR BLD AUTO: 3 %
GLUCOSE SERPL-MCNC: 290 MG/DL
HBA1C MFR BLD HPLC: 11 %
HCT VFR BLD CALC: 49.5 %
HDLC SERPL-MCNC: 14 MG/DL
HGB BLD-MCNC: 16.5 G/DL
IMM GRANULOCYTES NFR BLD AUTO: 0.4 %
LDLC SERPL CALC-MCNC: NORMAL
LYMPHOCYTES # BLD AUTO: 1.27 K/UL
LYMPHOCYTES NFR BLD AUTO: 15.9 %
MAN DIFF?: NORMAL
MCHC RBC-ENTMCNC: 29.9 PG
MCHC RBC-ENTMCNC: 33.3 GM/DL
MCV RBC AUTO: 89.8 FL
MICROALBUMIN 24H UR DL<=1MG/L-MCNC: 24.2 MG/DL
MICROALBUMIN/CREAT 24H UR-RTO: 129 MG/G
MONOCYTES # BLD AUTO: 0.37 K/UL
MONOCYTES NFR BLD AUTO: 4.6 %
NEUTROPHILS # BLD AUTO: 6.06 K/UL
NEUTROPHILS NFR BLD AUTO: 75.7 %
PLATELET # BLD AUTO: 232 K/UL
POTASSIUM SERPL-SCNC: 4.7 MMOL/L
PROT SERPL-MCNC: 7 G/DL
RBC # BLD: 5.51 M/UL
RBC # FLD: 14.7 %
SODIUM SERPL-SCNC: 132 MMOL/L
TRIGL SERPL-MCNC: 2827 MG/DL
WBC # FLD AUTO: 8 K/UL

## 2018-10-10 ENCOUNTER — APPOINTMENT (OUTPATIENT)
Dept: ENDOCRINOLOGY | Facility: CLINIC | Age: 53
End: 2018-10-10

## 2018-11-11 ENCOUNTER — APPOINTMENT (OUTPATIENT)
Dept: INTERNAL MEDICINE | Facility: CLINIC | Age: 53
End: 2018-11-11

## 2019-01-07 ENCOUNTER — RX RENEWAL (OUTPATIENT)
Age: 54
End: 2019-01-07

## 2019-02-20 ENCOUNTER — RX RENEWAL (OUTPATIENT)
Age: 54
End: 2019-02-20

## 2019-02-21 ENCOUNTER — RX RENEWAL (OUTPATIENT)
Age: 54
End: 2019-02-21

## 2019-03-17 ENCOUNTER — APPOINTMENT (OUTPATIENT)
Dept: INTERNAL MEDICINE | Facility: CLINIC | Age: 54
End: 2019-03-17
Payer: MEDICAID

## 2019-03-17 ENCOUNTER — LABORATORY RESULT (OUTPATIENT)
Age: 54
End: 2019-03-17

## 2019-03-17 VITALS — BODY MASS INDEX: 33.5 KG/M2 | WEIGHT: 234 LBS | HEIGHT: 70 IN

## 2019-03-17 VITALS
SYSTOLIC BLOOD PRESSURE: 116 MMHG | DIASTOLIC BLOOD PRESSURE: 76 MMHG | HEART RATE: 96 BPM | TEMPERATURE: 98.3 F | RESPIRATION RATE: 12 BRPM

## 2019-03-17 DIAGNOSIS — L03.019 CELLULITIS OF UNSPECIFIED FINGER: ICD-10-CM

## 2019-03-17 DIAGNOSIS — L02.519 CELLULITIS OF UNSPECIFIED FINGER: ICD-10-CM

## 2019-03-17 PROCEDURE — 99215 OFFICE O/P EST HI 40 MIN: CPT | Mod: 25

## 2019-03-17 PROCEDURE — 99406 BEHAV CHNG SMOKING 3-10 MIN: CPT

## 2019-03-17 PROCEDURE — G0008: CPT

## 2019-03-17 PROCEDURE — 90674 CCIIV4 VAC NO PRSV 0.5 ML IM: CPT

## 2019-03-17 RX ORDER — CLINDAMYCIN HYDROCHLORIDE 300 MG/1
300 CAPSULE ORAL
Qty: 15 | Refills: 0 | Status: DISCONTINUED | COMMUNITY
Start: 2018-08-19 | End: 2019-03-17

## 2019-03-17 NOTE — PHYSICAL EXAM
[No Acute Distress] : no acute distress [Well Nourished] : well nourished [Well Developed] : well developed [Well-Appearing] : well-appearing [PERRL] : pupils equal round and reactive to light [Normal Sclera/Conjunctiva] : normal sclera/conjunctiva [Normal Outer Ear/Nose] : the outer ears and nose were normal in appearance [EOMI] : extraocular movements intact [Normal Oropharynx] : the oropharynx was normal [No JVD] : no jugular venous distention [Supple] : supple [Thyroid Normal, No Nodules] : the thyroid was normal and there were no nodules present [No Lymphadenopathy] : no lymphadenopathy [Clear to Auscultation] : lungs were clear to auscultation bilaterally [No Respiratory Distress] : no respiratory distress  [No Accessory Muscle Use] : no accessory muscle use [Normal Rate] : normal rate  [Regular Rhythm] : with a regular rhythm [Normal S1, S2] : normal S1 and S2 [No Murmur] : no murmur heard [No Carotid Bruits] : no carotid bruits [No Abdominal Bruit] : a ~M bruit was not heard ~T in the abdomen [Pedal Pulses Present] : the pedal pulses are present [No Edema] : there was no peripheral edema [No Palpable Aorta] : no palpable aorta [Soft] : abdomen soft [Non-distended] : non-distended [No Masses] : no abdominal mass palpated [No HSM] : no HSM [Normal Bowel Sounds] : normal bowel sounds [Normal Posterior Cervical Nodes] : no posterior cervical lymphadenopathy [Normal Anterior Cervical Nodes] : no anterior cervical lymphadenopathy [No Spinal Tenderness] : no spinal tenderness [No CVA Tenderness] : no CVA  tenderness [No Joint Swelling] : no joint swelling [Normal Gait] : normal gait [Grossly Normal Strength/Tone] : grossly normal strength/tone [No Focal Deficits] : no focal deficits [Coordination Grossly Intact] : coordination grossly intact [Memory Grossly Normal] : memory grossly normal [Speech Grossly Normal] : speech grossly normal [Alert and Oriented x3] : oriented to person, place, and time [Normal Affect] : the affect was normal [Normal Insight/Judgement] : insight and judgment were intact [Normal Mood] : the mood was normal [Right Foot Was Examined] : Right foot ~C was examined [Left Foot Was Examined] : left foot ~C was examined [None] : no ulcers in either foot were found [] : both feet [de-identified] : obese [de-identified] : tenderness atr the rt lateral lower quadrant.  no rebound or gaurding.   [de-identified] : + tenderness at the rt lower back [de-identified] : following with pod

## 2019-03-17 NOTE — ASSESSMENT
[FreeTextEntry1] : to review HCM further on f/u.\par \par discussed the importance of controlling his med probs and the poss complications from the med issues if they are not controlled.

## 2019-03-17 NOTE — COUNSELING
[Weight management counseling provided] : Weight management [Activity counseling provided] : activity [Healthy eating counseling provided] : healthy eating [Discussed Risks and Advised to Quit Smoking] : Discussed risks and advised to quit smoking [Discussed Cessation Medication] : cessation medication was discussed [Needs reinforcement, referred] : Patient needs reinforcement on understanding of disease, goals and obesity follow-up plan; patient was referred [Discussed Cessation Strategies] : cessation strategies were discussed [Low Fat Diet] : Low fat diet [Low Salt Diet] : Low salt diet [Decrease Portions] : Decrease food portions [Walking] : Walking [None] : None [Quit Smoking] : Quit Smoking [Good understanding] : Patient has a good understanding of lifestyle changes and the steps needed to achieve self management goals [Tobacco Use Cessation Intermediate Greater Than 3 Minutes Up to 10 Minutes] : Tobacco Use Cessation Intermediate Greater Than 3 Minutes Up to 10 Minutes

## 2019-03-17 NOTE — REVIEW OF SYSTEMS
[Night Sweats] : night sweats [Recent Change In Weight] : ~T recent weight change [Abdominal Pain] : abdominal pain [Nocturia] : nocturia [Impotence] : impotence [Itching] : itching [Insomnia] : insomnia [Negative] : Heme/Lymph [Chills] : no chills [Fever] : no fever [Fatigue] : no fatigue [Hot Flashes] : no hot flashes [Nausea] : no nausea [Diarrhea] : no diarrhea [Constipation] : no constipation [Vomiting] : no vomiting [Heartburn] : no heartburn [Melena] : no melena [Incontinence] : no incontinence [Dysuria] : no dysuria [Hematuria] : no hematuria [Skin Rash] : no skin rash [Frequency] : no frequency [Anxiety] : no anxiety [Suicidal] : not suicidal [Depression] : no depression [FreeTextEntry2] : wt gain [de-identified] : numbness as noted - neuropathy [FreeTextEntry9] : back and leg pain, hand pain, neck and shoulder pains - flank pain as noted

## 2019-03-17 NOTE — HISTORY OF PRESENT ILLNESS
[Spouse] : spouse [FreeTextEntry1] : DM, neuropathy, hld, ED, leg/back pain\par flank pains [de-identified] : Pt is a 52 y/o male with a hx of DM, hld, neuropathy, ED, leg and back pain. \par Overdue for f/u - did not show up to routine f/u appt 4 mo ago.  \par He has been on medications for the DM - metformin, glipizide, januvia. On lipid meds - atorvastatin and gemfibrozil. On lyrica and amitriptyline for the neuropathy. \par Not taking the tradjenta\par Reports that he has been taking the meds.  \par diet needs improvement - Has been variable.\par Not Exercising- does walking at work - not consistent\par Wt gain\par  today.  Has been consistently > 200\par Reports that he saw ophtho last week.\par Has not followed up with endocrine or diabetic educators.\par \par Still with early waking - intermittnet, Was better with the amitriptyline \par Still with neuropathic sx. follows with Neurology - Dr Manish Holguin, also follows with pod- Dr Alicia - Reports that the neuropathic sx include pain at the leg and numbness at the toes. + burning at the feet and the back with some pinching. Pain at the toes has increased.  Had no relief with gabapentin - controlled with the lyrica  Some pinching/numbness sensation at the hands - s/p EMG.  in the past had been given oxy by other MD.\par + still with pain at the back.  s/p injection with relief.  Still with lt shoulder pain - had MRI.\par Reports no polyuria, + polydipsia. with nocturia x3. no polyphagia. \par no noted CV sx. \par no GI sx.\par no noted dizziness. no confusion.\par Reports pain at the right flank.  located at the rlq to the back.  Has been having for 4 days.  Has been intermittent - described as a 'hard pain'.  No noted nausea, vomiting, diarrhea.  no radiations.  some relief with naproxen.  Has not been worsening or improving.  Reports that he had the same pain a few years ago ? pancreatic etiology.  Denies urinary sx.  no fevers.  + sweats\par \par Still smoking - reports up to 5-6 a day.  no change.  Wants to see if he can stop by himself.  Does not want Rx.  \par \par Tdap - '17\par pneumo ??\par for flu vaccine\par \par ophtho - Reported as last week - Dr Ocasio\par colon - due to start.\par prost- 10/17.

## 2019-03-19 LAB
ALBUMIN SERPL ELPH-MCNC: 4.4 G/DL
ALP BLD-CCNC: 92 U/L
ALT SERPL-CCNC: 26 U/L
ANION GAP SERPL CALC-SCNC: 12 MMOL/L
APPEARANCE: ABNORMAL
AST SERPL-CCNC: 15 U/L
BASOPHILS # BLD AUTO: 0.07 K/UL
BASOPHILS NFR BLD AUTO: 0.8 %
BILIRUB SERPL-MCNC: 0.4 MG/DL
BILIRUBIN URINE: NEGATIVE
BLOOD URINE: NEGATIVE
BUN SERPL-MCNC: 15 MG/DL
CALCIUM SERPL-MCNC: 9 MG/DL
CHLORIDE SERPL-SCNC: 101 MMOL/L
CHOLEST SERPL-MCNC: 177 MG/DL
CHOLEST/HDLC SERPL: 8.9 RATIO
CO2 SERPL-SCNC: 21 MMOL/L
COLOR: ABNORMAL
CREAT SERPL-MCNC: 0.46 MG/DL
CREAT SPEC-SCNC: 212 MG/DL
EOSINOPHIL # BLD AUTO: 0.25 K/UL
EOSINOPHIL NFR BLD AUTO: 2.9 %
GLUCOSE QUALITATIVE U: ABNORMAL
GLUCOSE SERPL-MCNC: 287 MG/DL
HBA1C MFR BLD HPLC: 11 %
HCT VFR BLD CALC: 47.2 %
HDLC SERPL-MCNC: 20 MG/DL
HGB BLD-MCNC: 15.8 G/DL
IMM GRANULOCYTES NFR BLD AUTO: 0.5 %
KETONES URINE: NEGATIVE
LDLC SERPL CALC-MCNC: NORMAL MG/DL
LDLC SERPL DIRECT ASSAY-MCNC: 42 MG/DL
LEUKOCYTE ESTERASE URINE: NEGATIVE
LYMPHOCYTES # BLD AUTO: 1.29 K/UL
LYMPHOCYTES NFR BLD AUTO: 15.1 %
MAN DIFF?: NORMAL
MCHC RBC-ENTMCNC: 28.8 PG
MCHC RBC-ENTMCNC: 33.5 GM/DL
MCV RBC AUTO: 86.1 FL
MICROALBUMIN 24H UR DL<=1MG/L-MCNC: 45.5 MG/DL
MICROALBUMIN/CREAT 24H UR-RTO: 215 MG/G
MONOCYTES # BLD AUTO: 0.48 K/UL
MONOCYTES NFR BLD AUTO: 5.6 %
NEUTROPHILS # BLD AUTO: 6.41 K/UL
NEUTROPHILS NFR BLD AUTO: 75.1 %
NITRITE URINE: NEGATIVE
PH URINE: 6
PLATELET # BLD AUTO: 253 K/UL
POTASSIUM SERPL-SCNC: 4.8 MMOL/L
PROT SERPL-MCNC: 6.8 G/DL
PROTEIN URINE: ABNORMAL
RBC # BLD: 5.48 M/UL
RBC # FLD: 13.3 %
SODIUM SERPL-SCNC: 134 MMOL/L
SPECIFIC GRAVITY URINE: 1.04
TRIGL SERPL-MCNC: 1015 MG/DL
UROBILINOGEN URINE: NORMAL
WBC # FLD AUTO: 8.54 K/UL

## 2019-03-21 ENCOUNTER — RX RENEWAL (OUTPATIENT)
Age: 54
End: 2019-03-21

## 2019-03-23 ENCOUNTER — EMERGENCY (EMERGENCY)
Facility: HOSPITAL | Age: 54
LOS: 0 days | Discharge: HOME | End: 2019-03-23
Attending: EMERGENCY MEDICINE
Payer: MEDICAID

## 2019-03-23 VITALS
WEIGHT: 229.94 LBS | OXYGEN SATURATION: 96 % | SYSTOLIC BLOOD PRESSURE: 137 MMHG | TEMPERATURE: 98 F | DIASTOLIC BLOOD PRESSURE: 86 MMHG | HEIGHT: 70 IN | RESPIRATION RATE: 20 BRPM | HEART RATE: 89 BPM

## 2019-03-23 VITALS
OXYGEN SATURATION: 98 % | DIASTOLIC BLOOD PRESSURE: 90 MMHG | SYSTOLIC BLOOD PRESSURE: 147 MMHG | HEART RATE: 76 BPM | RESPIRATION RATE: 19 BRPM

## 2019-03-23 DIAGNOSIS — R20.0 ANESTHESIA OF SKIN: ICD-10-CM

## 2019-03-23 DIAGNOSIS — I10 ESSENTIAL (PRIMARY) HYPERTENSION: ICD-10-CM

## 2019-03-23 DIAGNOSIS — Z98.890 OTHER SPECIFIED POSTPROCEDURAL STATES: Chronic | ICD-10-CM

## 2019-03-23 DIAGNOSIS — Z79.4 LONG TERM (CURRENT) USE OF INSULIN: ICD-10-CM

## 2019-03-23 DIAGNOSIS — M54.31 SCIATICA, RIGHT SIDE: ICD-10-CM

## 2019-03-23 DIAGNOSIS — M54.5 LOW BACK PAIN: ICD-10-CM

## 2019-03-23 DIAGNOSIS — E11.9 TYPE 2 DIABETES MELLITUS WITHOUT COMPLICATIONS: ICD-10-CM

## 2019-03-23 LAB
ALBUMIN SERPL ELPH-MCNC: 3.5 G/DL — SIGNIFICANT CHANGE UP (ref 3.3–5)
ALP SERPL-CCNC: 105 U/L — SIGNIFICANT CHANGE UP (ref 40–120)
ALT FLD-CCNC: 32 U/L — SIGNIFICANT CHANGE UP (ref 12–78)
ANION GAP SERPL CALC-SCNC: 6 MMOL/L — SIGNIFICANT CHANGE UP (ref 5–17)
APPEARANCE UR: CLEAR — SIGNIFICANT CHANGE UP
AST SERPL-CCNC: 11 U/L — LOW (ref 15–37)
BACTERIA # UR AUTO: ABNORMAL
BASOPHILS # BLD AUTO: 0.06 K/UL — SIGNIFICANT CHANGE UP (ref 0–0.2)
BASOPHILS NFR BLD AUTO: 0.7 % — SIGNIFICANT CHANGE UP (ref 0–2)
BILIRUB SERPL-MCNC: 0.6 MG/DL — SIGNIFICANT CHANGE UP (ref 0.2–1.2)
BILIRUB UR-MCNC: NEGATIVE — SIGNIFICANT CHANGE UP
BUN SERPL-MCNC: 15 MG/DL — SIGNIFICANT CHANGE UP (ref 7–23)
CALCIUM SERPL-MCNC: 8.6 MG/DL — SIGNIFICANT CHANGE UP (ref 8.5–10.1)
CHLORIDE SERPL-SCNC: 103 MMOL/L — SIGNIFICANT CHANGE UP (ref 96–108)
CO2 SERPL-SCNC: 27 MMOL/L — SIGNIFICANT CHANGE UP (ref 22–31)
COLOR SPEC: YELLOW — SIGNIFICANT CHANGE UP
CREAT SERPL-MCNC: 0.64 MG/DL — SIGNIFICANT CHANGE UP (ref 0.5–1.3)
DIFF PNL FLD: ABNORMAL
EOSINOPHIL # BLD AUTO: 0.31 K/UL — SIGNIFICANT CHANGE UP (ref 0–0.5)
EOSINOPHIL NFR BLD AUTO: 3.8 % — SIGNIFICANT CHANGE UP (ref 0–6)
EPI CELLS # UR: SIGNIFICANT CHANGE UP
GLUCOSE SERPL-MCNC: 249 MG/DL — HIGH (ref 70–99)
GLUCOSE UR QL: 1000 MG/DL
HCT VFR BLD CALC: 43.7 % — SIGNIFICANT CHANGE UP (ref 39–50)
HGB BLD-MCNC: 14.9 G/DL — SIGNIFICANT CHANGE UP (ref 13–17)
IMM GRANULOCYTES NFR BLD AUTO: 0.4 % — SIGNIFICANT CHANGE UP (ref 0–1.5)
KETONES UR-MCNC: NEGATIVE — SIGNIFICANT CHANGE UP
LEUKOCYTE ESTERASE UR-ACNC: NEGATIVE — SIGNIFICANT CHANGE UP
LYMPHOCYTES # BLD AUTO: 1.61 K/UL — SIGNIFICANT CHANGE UP (ref 1–3.3)
LYMPHOCYTES # BLD AUTO: 19.7 % — SIGNIFICANT CHANGE UP (ref 13–44)
MCHC RBC-ENTMCNC: 28.7 PG — SIGNIFICANT CHANGE UP (ref 27–34)
MCHC RBC-ENTMCNC: 34.1 GM/DL — SIGNIFICANT CHANGE UP (ref 32–36)
MCV RBC AUTO: 84 FL — SIGNIFICANT CHANGE UP (ref 80–100)
MONOCYTES # BLD AUTO: 0.62 K/UL — SIGNIFICANT CHANGE UP (ref 0–0.9)
MONOCYTES NFR BLD AUTO: 7.6 % — SIGNIFICANT CHANGE UP (ref 2–14)
NEUTROPHILS # BLD AUTO: 5.53 K/UL — SIGNIFICANT CHANGE UP (ref 1.8–7.4)
NEUTROPHILS NFR BLD AUTO: 67.8 % — SIGNIFICANT CHANGE UP (ref 43–77)
NITRITE UR-MCNC: NEGATIVE — SIGNIFICANT CHANGE UP
NRBC # BLD: 0 /100 WBCS — SIGNIFICANT CHANGE UP (ref 0–0)
PH UR: 6 — SIGNIFICANT CHANGE UP (ref 5–8)
PLATELET # BLD AUTO: 233 K/UL — SIGNIFICANT CHANGE UP (ref 150–400)
POTASSIUM SERPL-MCNC: 4.2 MMOL/L — SIGNIFICANT CHANGE UP (ref 3.5–5.3)
POTASSIUM SERPL-SCNC: 4.2 MMOL/L — SIGNIFICANT CHANGE UP (ref 3.5–5.3)
PROT SERPL-MCNC: 6.8 GM/DL — SIGNIFICANT CHANGE UP (ref 6–8.3)
PROT UR-MCNC: 30 MG/DL
RBC # BLD: 5.2 M/UL — SIGNIFICANT CHANGE UP (ref 4.2–5.8)
RBC # FLD: 13.2 % — SIGNIFICANT CHANGE UP (ref 10.3–14.5)
RBC CASTS # UR COMP ASSIST: SIGNIFICANT CHANGE UP /HPF (ref 0–4)
SODIUM SERPL-SCNC: 136 MMOL/L — SIGNIFICANT CHANGE UP (ref 135–145)
SP GR SPEC: 1.01 — SIGNIFICANT CHANGE UP (ref 1.01–1.02)
UROBILINOGEN FLD QL: 1 MG/DL
WBC # BLD: 8.16 K/UL — SIGNIFICANT CHANGE UP (ref 3.8–10.5)
WBC # FLD AUTO: 8.16 K/UL — SIGNIFICANT CHANGE UP (ref 3.8–10.5)
WBC UR QL: SIGNIFICANT CHANGE UP

## 2019-03-23 PROCEDURE — 99285 EMERGENCY DEPT VISIT HI MDM: CPT | Mod: 25

## 2019-03-23 PROCEDURE — 74177 CT ABD & PELVIS W/CONTRAST: CPT | Mod: 26

## 2019-03-23 RX ORDER — KETOROLAC TROMETHAMINE 30 MG/ML
15 SYRINGE (ML) INJECTION ONCE
Qty: 0 | Refills: 0 | Status: DISCONTINUED | OUTPATIENT
Start: 2019-03-23 | End: 2019-03-23

## 2019-03-23 RX ORDER — IBUPROFEN 200 MG
1 TABLET ORAL
Qty: 20 | Refills: 0 | OUTPATIENT
Start: 2019-03-23 | End: 2019-03-27

## 2019-03-23 RX ORDER — SODIUM CHLORIDE 9 MG/ML
1000 INJECTION INTRAMUSCULAR; INTRAVENOUS; SUBCUTANEOUS ONCE
Qty: 0 | Refills: 0 | Status: COMPLETED | OUTPATIENT
Start: 2019-03-23 | End: 2019-03-23

## 2019-03-23 RX ORDER — MORPHINE SULFATE 50 MG/1
4 CAPSULE, EXTENDED RELEASE ORAL ONCE
Qty: 0 | Refills: 0 | Status: DISCONTINUED | OUTPATIENT
Start: 2019-03-23 | End: 2019-03-23

## 2019-03-23 RX ORDER — METHOCARBAMOL 500 MG/1
750 TABLET, FILM COATED ORAL ONCE
Qty: 0 | Refills: 0 | Status: COMPLETED | OUTPATIENT
Start: 2019-03-23 | End: 2019-03-23

## 2019-03-23 RX ORDER — MORPHINE SULFATE 50 MG/1
2 CAPSULE, EXTENDED RELEASE ORAL ONCE
Qty: 0 | Refills: 0 | Status: DISCONTINUED | OUTPATIENT
Start: 2019-03-23 | End: 2019-03-23

## 2019-03-23 RX ORDER — METHOCARBAMOL 500 MG/1
1 TABLET, FILM COATED ORAL
Qty: 15 | Refills: 0 | OUTPATIENT
Start: 2019-03-23 | End: 2019-03-27

## 2019-03-23 RX ADMIN — MORPHINE SULFATE 2 MILLIGRAM(S): 50 CAPSULE, EXTENDED RELEASE ORAL at 03:02

## 2019-03-23 RX ADMIN — METHOCARBAMOL 750 MILLIGRAM(S): 500 TABLET, FILM COATED ORAL at 02:33

## 2019-03-23 RX ADMIN — Medication 15 MILLIGRAM(S): at 02:33

## 2019-03-23 RX ADMIN — SODIUM CHLORIDE 1000 MILLILITER(S): 9 INJECTION INTRAMUSCULAR; INTRAVENOUS; SUBCUTANEOUS at 02:37

## 2019-03-23 RX ADMIN — MORPHINE SULFATE 4 MILLIGRAM(S): 50 CAPSULE, EXTENDED RELEASE ORAL at 05:56

## 2019-03-23 RX ADMIN — Medication 15 MILLIGRAM(S): at 03:02

## 2019-03-23 RX ADMIN — MORPHINE SULFATE 2 MILLIGRAM(S): 50 CAPSULE, EXTENDED RELEASE ORAL at 02:32

## 2019-03-23 RX ADMIN — MORPHINE SULFATE 4 MILLIGRAM(S): 50 CAPSULE, EXTENDED RELEASE ORAL at 06:26

## 2019-03-23 NOTE — ED ADULT NURSE NOTE - NSIMPLEMENTINTERV_GEN_ALL_ED
Implemented All Universal Safety Interventions:  [place] to call system. Call bell, personal items and telephone within reach. Instruct patient to call for assistance. Room bathroom lighting operational. Non-slip footwear when patient is off stretcher. Physically safe environment: no spills, clutter or unnecessary equipment. Stretcher in lowest position, wheels locked, appropriate side rails in place. Implemented All Fall Risk Interventions:  [place] to call system. Call bell, personal items and telephone within reach. Instruct patient to call for assistance. Room bathroom lighting operational. Non-slip footwear when patient is off stretcher. Physically safe environment: no spills, clutter or unnecessary equipment. Stretcher in lowest position, wheels locked, appropriate side rails in place. Provide visual cue, wrist band, yellow gown, etc. Monitor gait and stability. Monitor for mental status changes and reorient to person, place, and time. Review medications for side effects contributing to fall risk. Reinforce activity limits and safety measures with patient and family.

## 2019-03-23 NOTE — ED ADULT NURSE REASSESSMENT NOTE - NS ED NURSE REASSESS COMMENT FT1
@approximately 0358, Pt was found on the floor. Pt reports that he fell from his stretcher while sleeping, denies any injuries. Nursing assessment done for signs of injury, no visible injuries noted. Pt is at baseline mental status and denies any pain at this time. Vital signs taken (see flow sheet) MD was notified, no new orders given.

## 2019-03-23 NOTE — ED ADULT TRIAGE NOTE - CHIEF COMPLAINT QUOTE
pt presents c/o right sided flank and lower back pain x 2 weeks. Went to PMD ([facility]), had labs done, unk results, MRI was ordered, not performed yet. Denies saddle paresthesia or urinary/bowel incontinence, endorses constipation. Denies abd pain.

## 2019-03-23 NOTE — ED PROVIDER NOTE - PHYSICAL EXAMINATION
Gen: Alert, mild distress, well appearing  Head: NC, AT, PERRL, EOMI, normal lids/conjunctiva  ENT: normal hearing, patent oropharynx without erythema/exudate, uvula midline  Neck: +supple, no tenderness/meningismus/JVD, +Trachea midline  Pulm: Bilateral BS, normal resp effort, no wheeze/stridor/retractions  CV: RRR, no M/R/G, +dist pulses  Abd: soft, +right sided tenderness to palpation, ND, +BS, no palpable masses  Mskel: no edema/erythema/cyanosis, no CVA tenderness  Skin: no rash, warm/dry  Neuro: AAOx3, no apparent sensory/motor deficits, coordination intact

## 2019-03-23 NOTE — ED ADULT NURSE NOTE - OBJECTIVE STATEMENT
pt presents for evaluation of right sided low back pain and flank pain without dysuria or hematuria  x 2 weeks. Saw PMD for the same, had lab work done and was ordered for an MRI but did not have it performed yet. ambulates without assistance. no fever or chills. abd soft, non tender, non distended. iv placed, labs drawn and sent, 20 ga r ac. will continue to monitor awaiting results

## 2019-03-23 NOTE — ED PROVIDER NOTE - CLINICAL SUMMARY MEDICAL DECISION MAKING FREE TEXT BOX
Patient with right lower back pain to hip with occasional numbness and tingling.  VSS.  No clinical signs of cord compression.  Lab values reviewed, there are no values which require acute intervention, has hyperglycemia without DKA.  No acute intraabdominal findings on CT. Impression is that source of pain is likely sciatica. Pain is improved.  Discussed results and outcome of today's visit with the patient.  Patient advised to please follow up with another healthcare provider within the next 24 hours and return to the Emergency Department for worsening symptoms or any other concerns.  Patient advised that their doctor may call  to follow up on the specific results of the tests performed today in the emergency department.   Patient appears well on discharge.

## 2019-03-23 NOTE — ED PROVIDER NOTE - OBJECTIVE STATEMENT
Pertinent PMH/PSH/FHx/SHx and Review of Systems contained within:  Patient presents to the ED for right sided abdominal pain radiating to the right hip and right lower back.  Pain is present x2 weeks, intermittent, now worse and continuous, feels sensation of numbness in the right hip with occasional pins and needles.  Denies any fever, midline back pain, dysuria, hematuria.  Denies any falls or heavy lifting. Denies saddle numbness, leg weakness, or changes in bowel or bladder.    Relevant PMHx/SHx/SOCHx/FAMH:  HTN, DM, denies relevant pmh  Patient denies EtOH/tobacco/illicit substance use.    ROS: No fever/chills, No headache/photophobia/eye pain/changes in vision, No ear pain/sore throat/dysphagia, No chest pain/palpitations, no SOB/cough/wheeze/stridor, No N/V/D/melena, no dysuria/frequency/discharge, No neck pain, no rash, no changes in neurological status/function. Pertinent PMH/PSH/FHx/SHx and Review of Systems contained within:  Patient presents to the ED for right lower back radiating to right hip.  Pain is present x2 weeks, intermittent, now worse and continuous, feels sensation of numbness in the right hip with occasional pins and needles.  Denies any fever, midline back pain, dysuria, hematuria, or testicular pain.  Denies any falls or heavy lifting. Denies saddle numbness, leg weakness, or changes in bowel or bladder.    Relevant PMHx/SHx/SOCHx/FAMH:  HTN, DM, denies relevant pmh  Patient denies EtOH/tobacco/illicit substance use.    ROS: No fever/chills, No headache/photophobia/eye pain/changes in vision, No ear pain/sore throat/dysphagia, No chest pain/palpitations, no SOB/cough/wheeze/stridor, No N/V/D/melena, no dysuria/frequency/discharge, No neck pain, no rash, no changes in neurological status/function.

## 2019-03-24 LAB
CULTURE RESULTS: NO GROWTH — SIGNIFICANT CHANGE UP
SPECIMEN SOURCE: SIGNIFICANT CHANGE UP

## 2019-04-14 ENCOUNTER — APPOINTMENT (OUTPATIENT)
Dept: INTERNAL MEDICINE | Facility: CLINIC | Age: 54
End: 2019-04-14

## 2019-06-11 ENCOUNTER — RX RENEWAL (OUTPATIENT)
Age: 54
End: 2019-06-11

## 2019-07-17 ENCOUNTER — RX RENEWAL (OUTPATIENT)
Age: 54
End: 2019-07-17

## 2019-08-16 ENCOUNTER — APPOINTMENT (OUTPATIENT)
Dept: INTERNAL MEDICINE | Facility: CLINIC | Age: 54
End: 2019-08-16

## 2019-09-02 ENCOUNTER — RX RENEWAL (OUTPATIENT)
Age: 54
End: 2019-09-02

## 2019-09-12 ENCOUNTER — APPOINTMENT (OUTPATIENT)
Dept: INTERNAL MEDICINE | Facility: CLINIC | Age: 54
End: 2019-09-12

## 2019-09-19 ENCOUNTER — RX RENEWAL (OUTPATIENT)
Age: 54
End: 2019-09-19

## 2019-10-30 ENCOUNTER — RX RENEWAL (OUTPATIENT)
Age: 54
End: 2019-10-30

## 2019-10-31 ENCOUNTER — RX RENEWAL (OUTPATIENT)
Age: 54
End: 2019-10-31

## 2019-12-01 ENCOUNTER — APPOINTMENT (OUTPATIENT)
Dept: INTERNAL MEDICINE | Facility: CLINIC | Age: 54
End: 2019-12-01
Payer: MEDICAID

## 2019-12-01 VITALS
WEIGHT: 232 LBS | HEIGHT: 70 IN | SYSTOLIC BLOOD PRESSURE: 110 MMHG | RESPIRATION RATE: 14 BRPM | DIASTOLIC BLOOD PRESSURE: 80 MMHG | HEART RATE: 101 BPM | BODY MASS INDEX: 33.21 KG/M2

## 2019-12-01 DIAGNOSIS — Z87.39 PERSONAL HISTORY OF OTHER DISEASES OF THE MUSCULOSKELETAL SYSTEM AND CONNECTIVE TISSUE: ICD-10-CM

## 2019-12-01 DIAGNOSIS — M75.22 BICIPITAL TENDINITIS, LEFT SHOULDER: ICD-10-CM

## 2019-12-01 DIAGNOSIS — R10.31 RIGHT LOWER QUADRANT PAIN: ICD-10-CM

## 2019-12-01 DIAGNOSIS — R21 RASH AND OTHER NONSPECIFIC SKIN ERUPTION: ICD-10-CM

## 2019-12-01 DIAGNOSIS — Z12.5 ENCOUNTER FOR SCREENING FOR MALIGNANT NEOPLASM OF PROSTATE: ICD-10-CM

## 2019-12-01 DIAGNOSIS — Z12.11 ENCOUNTER FOR SCREENING FOR MALIGNANT NEOPLASM OF COLON: ICD-10-CM

## 2019-12-01 DIAGNOSIS — Z87.898 PERSONAL HISTORY OF OTHER SPECIFIED CONDITIONS: ICD-10-CM

## 2019-12-01 DIAGNOSIS — Z23 ENCOUNTER FOR IMMUNIZATION: ICD-10-CM

## 2019-12-01 DIAGNOSIS — Z87.19 PERSONAL HISTORY OF OTHER DISEASES OF THE DIGESTIVE SYSTEM: ICD-10-CM

## 2019-12-01 PROCEDURE — G0008: CPT

## 2019-12-01 PROCEDURE — 99406 BEHAV CHNG SMOKING 3-10 MIN: CPT

## 2019-12-01 PROCEDURE — 36415 COLL VENOUS BLD VENIPUNCTURE: CPT

## 2019-12-01 PROCEDURE — 90686 IIV4 VACC NO PRSV 0.5 ML IM: CPT

## 2019-12-01 PROCEDURE — 99214 OFFICE O/P EST MOD 30 MIN: CPT | Mod: 25

## 2019-12-01 RX ORDER — BLOOD-GLUCOSE METER
70 EACH MISCELLANEOUS
Qty: 1 | Refills: 5 | Status: ACTIVE | COMMUNITY
Start: 2019-12-01 | End: 1900-01-01

## 2019-12-01 NOTE — HISTORY OF PRESENT ILLNESS
[Spouse] : spouse [FreeTextEntry1] : DM, neuropathy, hld, ED, leg/back pain\par flank pains [de-identified] : Pt is a 53 y/o male with a hx of DM, hld, neuropathy, ED, leg and back pain. \par Overdue for f/u - last seen in March\par \par He has been on medications for the DM, lipid meds On lyrica for the neuropathy. ? not taking the amytriptyline\par Reports that he has been taking the meds.  \par diet needs improvement - Has been variable.\par Not Exercising- does walking at work - not consistent\par Wt gain\par Has not been checking the FS - reports that machine broken\par due to f/u with ophtho.\par Has not followed up with endocrine or diabetic educators.\par \par Sleeping has been okay\par Still with neuropathic sx. follows with Neurology - Dr Manish Holguin, also follows with pod- Dr Alicia - Reports that the neuropathic sx include pain at the leg and numbness at the toes. + burning at the feet and the back with some pinching. Pain at the toes has increased.  Had no relief with gabapentin - controlled with the lyrica  Some pinching/numbness sensation at the hands - s/p EMG.  in the past had been given oxy by other MD.  Reports that he has continued to follow.\par + still with pain at the back.  s/p injection with relief.  \par Still with lt shoulder pain - had MRI.\par Reports no polyuria, polydipsia. with nocturia x3. no polyphagia. \par no noted CV sx. \par no GI sx.\par no noted dizziness. no confusion.\par Flank and abd have been fine\par Wants refill of the cream for the groin\par \par Still smoking - reports up to 5-6 a day.  no change.  Wants to see if he can stop by himself.  Does not want Rx.  ? working on it.\par \par Tdap - '17\par pneumo ??\par for flu vaccine\par \par ophtho - due - Dr Ocasio\par colon - due to start.\par prost- 10/17.

## 2019-12-01 NOTE — HEALTH RISK ASSESSMENT
[] : Yes [Yes] : Yes [No] : In the past 12 months have you used drugs other than those required for medical reasons? No

## 2019-12-01 NOTE — PHYSICAL EXAM
[Well Nourished] : well nourished [No Acute Distress] : no acute distress [Well Developed] : well developed [Normal Sclera/Conjunctiva] : normal sclera/conjunctiva [Well-Appearing] : well-appearing [PERRL] : pupils equal round and reactive to light [EOMI] : extraocular movements intact [Normal Oropharynx] : the oropharynx was normal [No JVD] : no jugular venous distention [Normal Outer Ear/Nose] : the outer ears and nose were normal in appearance [No Lymphadenopathy] : no lymphadenopathy [Supple] : supple [No Respiratory Distress] : no respiratory distress  [Thyroid Normal, No Nodules] : the thyroid was normal and there were no nodules present [Clear to Auscultation] : lungs were clear to auscultation bilaterally [No Accessory Muscle Use] : no accessory muscle use [Normal Rate] : normal rate  [Normal S1, S2] : normal S1 and S2 [Regular Rhythm] : with a regular rhythm [No Murmur] : no murmur heard [No Carotid Bruits] : no carotid bruits [No Abdominal Bruit] : a ~M bruit was not heard ~T in the abdomen [Pedal Pulses Present] : the pedal pulses are present [No Edema] : there was no peripheral edema [Soft] : abdomen soft [Non-distended] : non-distended [Non Tender] : non-tender [No Masses] : no abdominal mass palpated [No HSM] : no HSM [Normal Bowel Sounds] : normal bowel sounds [Normal Posterior Cervical Nodes] : no posterior cervical lymphadenopathy [Normal Anterior Cervical Nodes] : no anterior cervical lymphadenopathy [No CVA Tenderness] : no CVA  tenderness [No Spinal Tenderness] : no spinal tenderness [No Joint Swelling] : no joint swelling [Grossly Normal Strength/Tone] : grossly normal strength/tone [Coordination Grossly Intact] : coordination grossly intact [No Rash] : no rash [No Focal Deficits] : no focal deficits [Normal Gait] : normal gait [Memory Grossly Normal] : memory grossly normal [Speech Grossly Normal] : speech grossly normal [Normal Affect] : the affect was normal [Alert and Oriented x3] : oriented to person, place, and time [Normal Insight/Judgement] : insight and judgment were intact [Normal Mood] : the mood was normal [de-identified] : following with pod [de-identified] : small umbilical hernia

## 2019-12-01 NOTE — COUNSELING
[Risk of tobacco use and health benefits of smoking cessation discussed] : Risk of tobacco use and health benefits of smoking cessation discussed [Cessation strategies including cessation program discussed] : Cessation strategies including cessation program discussed [Encouraged to pick a quit date and identify support needed to quit] : Encouraged to pick a quit date and identify support needed to quit [Tobacco Use Cessation Intermediate Greater Than 3 Minutes Up to 10 Minutes] : Tobacco Use Cessation Intermediate Greater Than 3 Minutes Up to 10 Minutes [Benefits of weight loss discussed] : Benefits of weight loss discussed [Potential consequences of obesity discussed] : Potential consequences of obesity discussed [Encouraged to increase physical activity] : Encouraged to increase physical activity [Needs reinforcement, provided] : Patient needs reinforcement on understanding of lifestyle changes and steps needed to achieve self management goal; reinforcement was provided [Patient motivation] : Patient motivation [FreeTextEntry1] : 5

## 2019-12-01 NOTE — REVIEW OF SYSTEMS
[Nocturia] : nocturia [Impotence] : impotence [Itching] : itching [Negative] : Heme/Lymph [Fever] : no fever [Hot Flashes] : no hot flashes [Fatigue] : no fatigue [Chills] : no chills [Night Sweats] : no night sweats [Recent Change In Weight] : ~T no recent weight change [Abdominal Pain] : no abdominal pain [Nausea] : no nausea [Constipation] : no constipation [Heartburn] : no heartburn [Vomiting] : no vomiting [Diarrhea] : no diarrhea [Dysuria] : no dysuria [Melena] : no melena [Hematuria] : no hematuria [Frequency] : no frequency [Incontinence] : no incontinence [Suicidal] : not suicidal [Skin Rash] : no skin rash [Insomnia] : no insomnia [Depression] : no depression [Anxiety] : no anxiety [FreeTextEntry2] : wt gain [de-identified] : numbness as noted - neuropathy [FreeTextEntry9] : back and leg pain, hand pain, neck and shoulder pains

## 2019-12-02 LAB
ALBUMIN SERPL ELPH-MCNC: 4.7 G/DL
ALP BLD-CCNC: 96 U/L
ALT SERPL-CCNC: 26 U/L
ANION GAP SERPL CALC-SCNC: 13 MMOL/L
AST SERPL-CCNC: 14 U/L
BILIRUB SERPL-MCNC: 0.4 MG/DL
BUN SERPL-MCNC: 19 MG/DL
CALCIUM SERPL-MCNC: 9.7 MG/DL
CHLORIDE SERPL-SCNC: 102 MMOL/L
CHOLEST SERPL-MCNC: 117 MG/DL
CHOLEST/HDLC SERPL: 4.9 RATIO
CO2 SERPL-SCNC: 20 MMOL/L
CREAT SERPL-MCNC: 0.57 MG/DL
CREAT SPEC-SCNC: 313 MG/DL
ESTIMATED AVERAGE GLUCOSE: 237 MG/DL
GLUCOSE SERPL-MCNC: 275 MG/DL
HBA1C MFR BLD HPLC: 9.9 %
HDLC SERPL-MCNC: 24 MG/DL
LDLC SERPL CALC-MCNC: NORMAL MG/DL
LDLC SERPL DIRECT ASSAY-MCNC: 35 MG/DL
MICROALBUMIN 24H UR DL<=1MG/L-MCNC: 56.6 MG/DL
MICROALBUMIN/CREAT 24H UR-RTO: 181 MG/G
POTASSIUM SERPL-SCNC: 4.5 MMOL/L
PROT SERPL-MCNC: 7.1 G/DL
PSA SERPL-MCNC: 0.79 NG/ML
SODIUM SERPL-SCNC: 134 MMOL/L
TRIGL SERPL-MCNC: 579 MG/DL

## 2019-12-02 RX ORDER — LINAGLIPTIN 5 MG/1
5 TABLET, FILM COATED ORAL DAILY
Qty: 30 | Refills: 3 | Status: DISCONTINUED | COMMUNITY
End: 2019-12-02

## 2020-01-16 LAB — HEMOCCULT STL QL IA: NEGATIVE

## 2020-01-27 ENCOUNTER — RX RENEWAL (OUTPATIENT)
Age: 55
End: 2020-01-27

## 2020-04-30 ENCOUNTER — RX RENEWAL (OUTPATIENT)
Age: 55
End: 2020-04-30

## 2020-05-21 NOTE — H&P ADULT - NSHPPHYSICALEXAM_GEN_ALL_CORE
GENERAL: NAD, well-groomed, well-developed  HEAD:  Atraumatic, Normocephalic  EYES: EOMI, PERRLA, conjunctiva and sclera clear  ENMT: No tonsillar erythema, exudates, or enlargement; Moist mucous membranes, Good dentition, No lesions  NECK: Supple, No JVD, Normal thyroid  NERVOUS SYSTEM:  Alert & Oriented X3, Good concentration; Motor Strength 5/5 B/L upper and lower extremities; DTRs 2+ intact and symmetric  CHEST/LUNG: Clear to percussion bilaterally; No rales, rhonchi, wheezing, or rubs  HEART: Regular rate and rhythm; No murmurs, rubs, or gallops  ABDOMEN: Soft, Nontender, Nondistended; Bowel sounds present  EXTREMITIES:  2+ Peripheral Pulses right foot with bruisng and tenderness multiple metatarsels   LYMPH: No lymphadenopathy noted  RECTAL: No masses, prostate normal size and smooth, Guiac negative     SKIN: No rashes or lesions clear

## 2020-06-22 ENCOUNTER — RX RENEWAL (OUTPATIENT)
Age: 55
End: 2020-06-22

## 2020-08-09 ENCOUNTER — LABORATORY RESULT (OUTPATIENT)
Age: 55
End: 2020-08-09

## 2020-08-09 ENCOUNTER — APPOINTMENT (OUTPATIENT)
Dept: INTERNAL MEDICINE | Facility: CLINIC | Age: 55
End: 2020-08-09
Payer: MEDICAID

## 2020-08-09 ENCOUNTER — NON-APPOINTMENT (OUTPATIENT)
Age: 55
End: 2020-08-09

## 2020-08-09 VITALS
SYSTOLIC BLOOD PRESSURE: 110 MMHG | BODY MASS INDEX: 33.5 KG/M2 | HEIGHT: 70 IN | WEIGHT: 234 LBS | DIASTOLIC BLOOD PRESSURE: 70 MMHG | RESPIRATION RATE: 15 BRPM | HEART RATE: 106 BPM

## 2020-08-09 DIAGNOSIS — Z20.828 CONTACT WITH AND (SUSPECTED) EXPOSURE TO OTHER VIRAL COMMUNICABLE DISEASES: ICD-10-CM

## 2020-08-09 PROCEDURE — 93000 ELECTROCARDIOGRAM COMPLETE: CPT

## 2020-08-09 PROCEDURE — 36415 COLL VENOUS BLD VENIPUNCTURE: CPT

## 2020-08-09 PROCEDURE — 99214 OFFICE O/P EST MOD 30 MIN: CPT | Mod: 25

## 2020-08-09 NOTE — COUNSELING
[Potential consequences of obesity discussed] : Potential consequences of obesity discussed [Benefits of weight loss discussed] : Benefits of weight loss discussed [Encouraged to increase physical activity] : Encouraged to increase physical activity [Decrease Portions] : decrease portions [Patient Non-adherent to care plan] : Patient non-adherent to care plan [Patient motivation] : Patient motivation [Needs reinforcement, provided] : Patient needs reinforcement on understanding of lifestyle changes and steps needed to achieve self management goal; reinforcement was provided

## 2020-08-09 NOTE — HISTORY OF PRESENT ILLNESS
[Spouse] : spouse [FreeTextEntry1] : DM, neuropathy, hld, ED, leg/back pain [de-identified] : Pt is a 55 y/o male with a hx of DM, hld, neuropathy, ED, leg and back pain. \par Overdue for f/u - last seen in Dec\par \par He has been on medications for the DM, lipid meds On lyrica for the neuropathy.\par Reports that he has been taking the meds.  \par diet needs improvement - Has been variable.\par Not Exercising- does walking at work - not consistent\par Wt gain\par FS - has been high  - 180s to > 200\par due to f/u with ophtho.\par Has not followed up with endocrine or diabetic educators.\par \par Sleeping has been okay\par Still with neuropathic sx. follows with Neurology - Dr Manish Holguin, also follows with pod- Dr Alicia - Reports that the neuropathic sx include pain at the leg and numbness at the toes. + burning at the feet and the back with some pinching. Pain at the toes has increased.  Had no relief with gabapentin - controlled with the lyrica  Some pinching/numbness sensation at the hands - s/p EMG.  in the past had been given oxy by other MD.  Reports that he has continued to follow.\par + still with pain at the back.  s/p injection with relief.  Has been increasing.  \par Shoulder has been improved.  \par Reports no polyuria, with night time polydipsia. with nocturia x3. no polyphagia. \par no noted CV sx. \par no GI sx.  Has been havign occ GERD sx.  \par no noted dizziness. no confusion.\par Flank and abd have been fine\par Wants refill of the cream for the groin\par \par Has stopped smoking.  \par \par Tdap - '17\par pneumo ??\par for flu vaccine\par \par ophtho - due - Dr Ocasio\par colon - fit 1/20\par prost- 12/19

## 2020-08-09 NOTE — REVIEW OF SYSTEMS
[Nocturia] : nocturia [Impotence] : impotence [Itching] : itching [Negative] : Respiratory [Fever] : no fever [Chills] : no chills [Hot Flashes] : no hot flashes [Fatigue] : no fatigue [Night Sweats] : no night sweats [Abdominal Pain] : no abdominal pain [Recent Change In Weight] : ~T no recent weight change [Vomiting] : no vomiting [Nausea] : no nausea [Diarrhea] : no diarrhea [Constipation] : no constipation [Heartburn] : no heartburn [Melena] : no melena [Incontinence] : no incontinence [Dysuria] : no dysuria [Hematuria] : no hematuria [Skin Rash] : no skin rash [Frequency] : no frequency [Insomnia] : no insomnia [Suicidal] : not suicidal [Anxiety] : no anxiety [Depression] : no depression [FreeTextEntry2] : wt gain [FreeTextEntry9] : back and leg pain, hand pain, neck and shoulder pains [de-identified] : numbness as noted - neuropathy

## 2020-08-09 NOTE — HEALTH RISK ASSESSMENT
[] : Yes [Yes] : Yes [Monthly or less (1 pt)] : Monthly or less (1 point) [Never (0 pts)] : Never (0 points) [1 or 2 (0 pts)] : 1 or 2 (0 points) [No] : In the past 12 months have you used drugs other than those required for medical reasons? No [0] : 2) Feeling down, depressed, or hopeless: Not at all (0) [[id number]] : 0 [Audit-CScore] : 1

## 2020-08-10 LAB
ALBUMIN SERPL ELPH-MCNC: 4.5 G/DL
ALP BLD-CCNC: 87 U/L
ALT SERPL-CCNC: 36 U/L
ANION GAP SERPL CALC-SCNC: 17 MMOL/L
APPEARANCE: CLEAR
AST SERPL-CCNC: 21 U/L
BILIRUB SERPL-MCNC: 0.4 MG/DL
BILIRUBIN URINE: NEGATIVE
BLOOD URINE: NEGATIVE
BUN SERPL-MCNC: 17 MG/DL
CALCIUM SERPL-MCNC: 9.5 MG/DL
CHLORIDE SERPL-SCNC: 99 MMOL/L
CHOLEST SERPL-MCNC: 152 MG/DL
CHOLEST/HDLC SERPL: 7.3 RATIO
CO2 SERPL-SCNC: 21 MMOL/L
COLOR: YELLOW
CREAT SERPL-MCNC: 0.77 MG/DL
CREAT SPEC-SCNC: 152 MG/DL
ESTIMATED AVERAGE GLUCOSE: 278 MG/DL
GLUCOSE QUALITATIVE U: ABNORMAL
GLUCOSE SERPL-MCNC: 267 MG/DL
HBA1C MFR BLD HPLC: 11.3 %
HDLC SERPL-MCNC: 21 MG/DL
KETONES URINE: NEGATIVE
LDLC SERPL CALC-MCNC: NORMAL MG/DL
LDLC SERPL DIRECT ASSAY-MCNC: 27 MG/DL
LEUKOCYTE ESTERASE URINE: NEGATIVE
MICROALBUMIN 24H UR DL<=1MG/L-MCNC: 14.6 MG/DL
MICROALBUMIN/CREAT 24H UR-RTO: 96 MG/G
NITRITE URINE: NEGATIVE
PH URINE: 6
POTASSIUM SERPL-SCNC: 4.8 MMOL/L
PROT SERPL-MCNC: 6.9 G/DL
PROTEIN URINE: ABNORMAL
SARS-COV-2 IGG SERPL IA-ACNC: 0.09 INDEX
SARS-COV-2 IGG SERPL QL IA: NEGATIVE
SODIUM SERPL-SCNC: 137 MMOL/L
SPECIFIC GRAVITY URINE: 1.03
TRIGL SERPL-MCNC: 875 MG/DL
UROBILINOGEN URINE: NORMAL

## 2020-10-27 ENCOUNTER — APPOINTMENT (OUTPATIENT)
Dept: ENDOCRINOLOGY | Facility: CLINIC | Age: 55
End: 2020-10-27

## 2020-12-10 ENCOUNTER — RX RENEWAL (OUTPATIENT)
Age: 55
End: 2020-12-10

## 2020-12-27 ENCOUNTER — RX RENEWAL (OUTPATIENT)
Age: 55
End: 2020-12-27

## 2020-12-31 ENCOUNTER — RX RENEWAL (OUTPATIENT)
Age: 55
End: 2020-12-31

## 2021-01-07 ENCOUNTER — RX RENEWAL (OUTPATIENT)
Age: 56
End: 2021-01-07

## 2021-01-10 ENCOUNTER — APPOINTMENT (OUTPATIENT)
Dept: INTERNAL MEDICINE | Facility: CLINIC | Age: 56
End: 2021-01-10
Payer: MEDICAID

## 2021-01-10 VITALS
WEIGHT: 232 LBS | HEART RATE: 89 BPM | RESPIRATION RATE: 16 BRPM | DIASTOLIC BLOOD PRESSURE: 80 MMHG | BODY MASS INDEX: 33.21 KG/M2 | HEIGHT: 70 IN | SYSTOLIC BLOOD PRESSURE: 120 MMHG | OXYGEN SATURATION: 97 %

## 2021-01-10 DIAGNOSIS — Z87.891 PERSONAL HISTORY OF NICOTINE DEPENDENCE: ICD-10-CM

## 2021-01-10 PROCEDURE — 99214 OFFICE O/P EST MOD 30 MIN: CPT | Mod: 25

## 2021-01-10 PROCEDURE — 36415 COLL VENOUS BLD VENIPUNCTURE: CPT

## 2021-01-10 PROCEDURE — 90686 IIV4 VACC NO PRSV 0.5 ML IM: CPT

## 2021-01-10 PROCEDURE — G0008: CPT

## 2021-01-10 PROCEDURE — 99072 ADDL SUPL MATRL&STAF TM PHE: CPT

## 2021-01-10 NOTE — PHYSICAL EXAM
[Well Developed] : well developed [Well Nourished] : well nourished [Well-Appearing] : well-appearing [Normal Sclera/Conjunctiva] : normal sclera/conjunctiva [PERRL] : pupils equal round and reactive to light [EOMI] : extraocular movements intact [Normal Outer Ear/Nose] : the outer ears and nose were normal in appearance [Normal Oropharynx] : the oropharynx was normal [No JVD] : no jugular venous distention [No Lymphadenopathy] : no lymphadenopathy [Supple] : supple [Thyroid Normal, No Nodules] : the thyroid was normal and there were no nodules present [No Accessory Muscle Use] : no accessory muscle use [No Respiratory Distress] : no respiratory distress  [Clear to Auscultation] : lungs were clear to auscultation bilaterally [Normal Rate] : normal rate  [Regular Rhythm] : with a regular rhythm [Normal S1, S2] : normal S1 and S2 [No Murmur] : no murmur heard [No Carotid Bruits] : no carotid bruits [No Abdominal Bruit] : a ~M bruit was not heard ~T in the abdomen [Pedal Pulses Present] : the pedal pulses are present [Soft] : abdomen soft [No Edema] : there was no peripheral edema [Non Tender] : non-tender [Non-distended] : non-distended [No Masses] : no abdominal mass palpated [No HSM] : no HSM [Normal Bowel Sounds] : normal bowel sounds [Normal Anterior Cervical Nodes] : no anterior cervical lymphadenopathy [Normal Posterior Cervical Nodes] : no posterior cervical lymphadenopathy [No CVA Tenderness] : no CVA  tenderness [No Spinal Tenderness] : no spinal tenderness [No Joint Swelling] : no joint swelling [Grossly Normal Strength/Tone] : grossly normal strength/tone [Coordination Grossly Intact] : coordination grossly intact [No Rash] : no rash [No Focal Deficits] : no focal deficits [Normal Gait] : normal gait [Speech Grossly Normal] : speech grossly normal [Memory Grossly Normal] : memory grossly normal [Normal Affect] : the affect was normal [Alert and Oriented x3] : oriented to person, place, and time [Normal Mood] : the mood was normal [Normal Insight/Judgement] : insight and judgment were intact [de-identified] : small umbilical hernia [de-identified] : following with pod

## 2021-01-10 NOTE — HEALTH RISK ASSESSMENT
[Yes] : Yes [Monthly or less (1 pt)] : Monthly or less (1 point) [1 or 2 (0 pts)] : 1 or 2 (0 points) [Never (0 pts)] : Never (0 points) [No] : In the past 12 months have you used drugs other than those required for medical reasons? No [No falls in past year] : Patient reported no falls in the past year [0] : 2) Feeling down, depressed, or hopeless: Not at all (0) [Audit-CScore] : 1 [] : No [[id number]] : 0

## 2021-01-10 NOTE — REVIEW OF SYSTEMS
[Nocturia] : nocturia [Impotence] : impotence [Negative] : Heme/Lymph [Fever] : no fever [Chills] : no chills [Hot Flashes] : no hot flashes [Fatigue] : no fatigue [Night Sweats] : no night sweats [Recent Change In Weight] : ~T no recent weight change [Abdominal Pain] : no abdominal pain [Nausea] : no nausea [Diarrhea] : no diarrhea [Constipation] : no constipation [Vomiting] : no vomiting [Heartburn] : no heartburn [Melena] : no melena [Dysuria] : no dysuria [Incontinence] : no incontinence [Hematuria] : no hematuria [Frequency] : no frequency [Itching] : no itching [Skin Rash] : no skin rash [Suicidal] : not suicidal [Insomnia] : no insomnia [Anxiety] : no anxiety [Depression] : no depression [FreeTextEntry2] : wt gain [FreeTextEntry9] : back and leg pain, hand pain, neck and shoulder pains [de-identified] : numbness as noted - neuropathy

## 2021-01-10 NOTE — HISTORY OF PRESENT ILLNESS
[FreeTextEntry1] : DM, neuropathy, hld, ED, leg/back pain [de-identified] : Pt is a 56 y/o male with a hx of DM, hld, neuropathy, ED, leg and back pain. \par He has been on medications for the DM, lipid meds On lyrica for the neuropathy.   \par Reports that he ran out of his meds - has not been taking.  \par diet needs improvement - Has been variable.\par Not Exercising- does walking at work - not consistent\par Wt gain\par FS - has not been checking.  \par due to f/u with ophtho.  Has appt next mo.\par Has not followed up with endocrine or diabetic educators.  Reports that he has appt.\par \par Sleeping has been okay\par Still with neuropathic sx. follows with Neurology - Dr Manish Holguin, also follows with pod- Dr Alicia - Reports that the neuropathic sx include pain at the leg and numbness at the toes. + burning at the feet and the back with some pinching. Pain at the toes has increased. Had no relief with gabapentin - controlled with the lyrica Some pinching/numbness sensation at the hands - s/p EMG. in the past had been given oxy by other MD. Reports that he has continued to follow.\par + still with pain at the back. s/p injection with relief. Has been increasing. \par + polyuria, with night time polydipsia - with nocturia x3. no polyphagia. \par no noted CV sx. \par no GI sx. Has been havign occ GERD sx. \par no noted dizziness. no confusion.\par Flank and abd have been fine\par \par Has stopped smoking. \par \par Tdap - '17\par pneumo ??\par for flu vaccine\par \par ophtho - due - Dr Ocasio\par colon - fit 1/20\par prost- 12/19

## 2021-01-29 ENCOUNTER — RX RENEWAL (OUTPATIENT)
Age: 56
End: 2021-01-29

## 2021-02-16 ENCOUNTER — APPOINTMENT (OUTPATIENT)
Dept: INTERNAL MEDICINE | Facility: CLINIC | Age: 56
End: 2021-02-16
Payer: MEDICAID

## 2021-02-16 PROCEDURE — 99213 OFFICE O/P EST LOW 20 MIN: CPT | Mod: 95

## 2021-02-16 NOTE — REVIEW OF SYSTEMS
[Fatigue] : fatigue [Nocturia] : nocturia [Impotence] : impotence [Headache] : headache [Negative] : Heme/Lymph [Fever] : no fever [Chills] : no chills [Hot Flashes] : no hot flashes [Night Sweats] : no night sweats [Recent Change In Weight] : ~T no recent weight change [Abdominal Pain] : no abdominal pain [Nausea] : no nausea [Constipation] : no constipation [Diarrhea] : no diarrhea [Vomiting] : no vomiting [Heartburn] : no heartburn [Melena] : no melena [Dysuria] : no dysuria [Incontinence] : no incontinence [Hematuria] : no hematuria [Frequency] : no frequency [Itching] : no itching [Skin Rash] : no skin rash [Suicidal] : not suicidal [Insomnia] : no insomnia [Anxiety] : no anxiety [Depression] : no depression [FreeTextEntry9] : back and leg pain, hand pain, neck and shoulder pains [de-identified] : numbness as noted - neuropathy

## 2021-02-16 NOTE — PHYSICAL EXAM
[No Acute Distress] : no acute distress [Well Nourished] : well nourished [Well Developed] : well developed [Well-Appearing] : well-appearing [Normal Sclera/Conjunctiva] : normal sclera/conjunctiva [No Respiratory Distress] : no respiratory distress  [No Accessory Muscle Use] : no accessory muscle use [Coordination Grossly Intact] : coordination grossly intact [Speech Grossly Normal] : speech grossly normal [Memory Grossly Normal] : memory grossly normal [Normal Affect] : the affect was normal [Alert and Oriented x3] : oriented to person, place, and time [Normal Mood] : the mood was normal [Normal Insight/Judgement] : insight and judgment were intact

## 2021-02-16 NOTE — HISTORY OF PRESENT ILLNESS
[Home] : at home, [unfilled] , at the time of the visit. [Medical Office: ([facility])___] : at the medical office located in  [Verbal consent obtained from patient] : the patient, [unfilled] [FreeTextEntry8] : Pt is a 54 y/o male with a hx of DM, hld, neuropathy, ED, leg and back pain. \par REports covid - 19 infection.  Sx started ` 1 week ago.  + headaches, joint pains.  + mild cough.  no fever, dyspnea, GI sx.  Smell has been good.  Sx have been better besides the headaches.  Has been taking otc rx - mainly tylenol.  \par Otherwise, reports that things have been the same.  \par \par He has been on medications for the DM, lipid meds On lyrica for the neuropathy. \par Meds - has been taking.  \par diet needs improvement - Has been variable.\par Not Exercising- does walking at work - not consistent\par Wt gain\par FS - has not been checking. \par due to f/u with ophtho. Has appt next mo.\par Has not followed up with endocrine or diabetic educators. Reports that he has appt.\par \par Sleeping has been okay\par Still with neuropathic sx. follows with Neurology - Dr Manish Holguin, also follows with pod- Dr Alicia - Reports that the neuropathic sx include pain at the leg and numbness at the toes. + burning at the feet and the back with some pinching. Pain at the toes has increased. Had no relief with gabapentin - controlled with the lyrica Some pinching/numbness sensation at the hands - s/p EMG. in the past had been given oxy by other MD. Reports that he has continued to follow.\par + still with pain at the back. s/p injection with relief. Has been increasing. \par + polyuria, with night time polydipsia - with nocturia x3. no polyphagia. \par no noted CV sx. \par no GI sx. Has been havign occ GERD sx. \par no noted dizziness. no confusion.\par Flank and abd have been fine\par \par Has stopped smoking. \par \par Tdap - '17\par pneumo ??\par for flu vaccine\par \par ophtho - due - Dr Ocasio\par colon - fit 1/20\par prost- 12/19

## 2021-02-16 NOTE — HEALTH RISK ASSESSMENT
[Yes] : Yes [Monthly or less (1 pt)] : Monthly or less (1 point) [1 or 2 (0 pts)] : 1 or 2 (0 points) [Never (0 pts)] : Never (0 points) [No] : In the past 12 months have you used drugs other than those required for medical reasons? No [0] : 2) Feeling down, depressed, or hopeless: Not at all (0) [] : No [Audit-CScore] : 1 [[id number]] : 0

## 2021-03-04 ENCOUNTER — RX RENEWAL (OUTPATIENT)
Age: 56
End: 2021-03-04

## 2021-03-07 ENCOUNTER — APPOINTMENT (OUTPATIENT)
Dept: INTERNAL MEDICINE | Facility: CLINIC | Age: 56
End: 2021-03-07

## 2021-03-24 ENCOUNTER — RX RENEWAL (OUTPATIENT)
Age: 56
End: 2021-03-24

## 2021-04-08 ENCOUNTER — APPOINTMENT (OUTPATIENT)
Dept: ENDOCRINOLOGY | Facility: CLINIC | Age: 56
End: 2021-04-08
Payer: MEDICAID

## 2021-04-08 ENCOUNTER — LABORATORY RESULT (OUTPATIENT)
Age: 56
End: 2021-04-08

## 2021-04-08 VITALS
OXYGEN SATURATION: 98 % | SYSTOLIC BLOOD PRESSURE: 121 MMHG | BODY MASS INDEX: 32.93 KG/M2 | TEMPERATURE: 97.5 F | HEART RATE: 88 BPM | HEIGHT: 70 IN | RESPIRATION RATE: 17 BRPM | DIASTOLIC BLOOD PRESSURE: 70 MMHG | WEIGHT: 230 LBS

## 2021-04-08 LAB — GLUCOSE BLDC GLUCOMTR-MCNC: 316

## 2021-04-08 PROCEDURE — 95250 CONT GLUC MNTR PHYS/QHP EQP: CPT

## 2021-04-08 PROCEDURE — 95251 CONT GLUC MNTR ANALYSIS I&R: CPT

## 2021-04-08 PROCEDURE — 99072 ADDL SUPL MATRL&STAF TM PHE: CPT

## 2021-04-08 PROCEDURE — 99205 OFFICE O/P NEW HI 60 MIN: CPT | Mod: 25

## 2021-04-08 RX ORDER — PEN NEEDLE, DIABETIC 29 G X1/2"
32G X 4 MM NEEDLE, DISPOSABLE MISCELLANEOUS
Qty: 400 | Refills: 3 | Status: ACTIVE | COMMUNITY
Start: 2021-04-08 | End: 1900-01-01

## 2021-04-08 RX ORDER — SITAGLIPTIN 100 MG/1
100 TABLET, FILM COATED ORAL
Qty: 30 | Refills: 2 | Status: DISCONTINUED | COMMUNITY
Start: 2018-04-09 | End: 2021-04-08

## 2021-04-08 RX ORDER — ALOGLIPTIN 25 MG/1
25 TABLET, FILM COATED ORAL
Qty: 30 | Refills: 5 | Status: DISCONTINUED | COMMUNITY
Start: 2020-04-30 | End: 2021-04-08

## 2021-04-08 NOTE — HISTORY OF PRESENT ILLNESS
[FreeTextEntry1] : HISTORY OF PRESENT ILLNESS. \par \par Mr. JARAMILLO was diagnosed with Diabetes Mellitus Type 2 more than 10 years ago. He reports history HTN, dyslipidemia, neuropathy  (on lyrica) and denies CAD,  known complications of retinopathy, nephropathy. He is presently on Glipizide 10 mg qd, Nesina 25 mg qd,  Januvia 100 mg qd (? if on both Nesina and Januvia), metformin 1000 mg bid.\par Blood sugars are not checked at home.\par Hypoglycemia frequency: none\par Fingerstick glucose in the office today is 316 mg/dL (fasting)\par Diet: not following ADA\par Exercise: none\par \par Lab review: a1c- 11.3\par \par Last dilated eye - 2020\par Last podiatry visit  - 2020 \par Last cardiology evaluation - none\par Last stress test - none\par Last 2-D Echo -none\par Last nephrology evaluation - none\par Last neurology evaluation - 2020

## 2021-04-08 NOTE — ASSESSMENT
[Diabetes Foot Care] : diabetes foot care [Long Term Vascular Complications] : long term vascular complications of diabetes [Carbohydrate Consistent Diet] : carbohydrate consistent diet [Importance of Diet and Exercise] : importance of diet and exercise to improve glycemic control, achieve weight loss and improve cardiovascular health [Exercise/Effect on Glucose] : exercise/effect on glucose [Hypoglycemia Management] : hypoglycemia management [Glucagon Use] : glucagon use [Action and use of Insulin] : action and use of short and long-acting insulin [Self Monitoring of Blood Glucose] : self monitoring of blood glucose [Insulin Self-Administration] : insulin self-administration [Injection Technique, Storage, Sharps Disposal] : injection technique, storage, and sharps disposal [Sick-Day Management] : sick-day management [Retinopathy Screening] : Patient was referred to ophthalmology for retinopathy screening [Smoking Cessation] : smoking cessation [Weight Loss] : weight loss [Diabetic Medications] : Risks and benefits of diabetic medications were discussed [FreeTextEntry1] : Current approaches to diabetes management are discussed with the patient. \par Target ranges for blood sugar, blood pressure and cholesterol reviewed, and risk reduction strategies verified. \par Hypoglycemia precautions reviewed with the patient. \par Suggested extensive diabetes education program, including nutritional and diabetes teaching and evaluation. \par Proper dietary restrictions and exercise routines discussed. \par Glucometer/SMBG and log book charting discussed. Resume monitoring\par - labs today\par - Pa PRO\par - d/c Januvia, Nesina, Glipizide\par - Basaglar 25 un HS\par - Admelog 8-6-10\par - if TG ok, will start Ozempic 0.25 mg qw for 4 weeks, then increase to 0.5 mg qw. Otherwise will wait until hyperTG improves\par - monitor BP, lipids, urine microalbumin\par - cont lipitor, lopid, fish oil\par - smoking cessation\par - elective cardiology evaluation\par RTC 2- 3 weeks for sensor download and CDE evaluation\par

## 2021-04-09 LAB
ALBUMIN SERPL ELPH-MCNC: 4.7 G/DL
ALP BLD-CCNC: 95 U/L
ALT SERPL-CCNC: 30 U/L
ANION GAP SERPL CALC-SCNC: 14 MMOL/L
AST SERPL-CCNC: 18 U/L
BILIRUB SERPL-MCNC: 0.5 MG/DL
BUN SERPL-MCNC: 20 MG/DL
C PEPTIDE SERPL-MCNC: 6.1 NG/ML
CALCIUM SERPL-MCNC: 9.5 MG/DL
CHLORIDE SERPL-SCNC: 99 MMOL/L
CHOLEST SERPL-MCNC: 120 MG/DL
CO2 SERPL-SCNC: 23 MMOL/L
CREAT SERPL-MCNC: 0.63 MG/DL
CREAT SPEC-SCNC: 170 MG/DL
ESTIMATED AVERAGE GLUCOSE: 263 MG/DL
FOLATE SERPL-MCNC: 10.6 NG/ML
FRUCTOSAMINE SERPL-MCNC: 433 UMOL/L
GLUCOSE SERPL-MCNC: 324 MG/DL
HBA1C MFR BLD HPLC: 10.8 %
HDLC SERPL-MCNC: 22 MG/DL
LDLC SERPL CALC-MCNC: NORMAL MG/DL
MICROALBUMIN 24H UR DL<=1MG/L-MCNC: 46.8 MG/DL
MICROALBUMIN/CREAT 24H UR-RTO: 275 MG/G
NONHDLC SERPL-MCNC: 98 MG/DL
POTASSIUM SERPL-SCNC: 4.8 MMOL/L
PROT SERPL-MCNC: 7.1 G/DL
SODIUM SERPL-SCNC: 135 MMOL/L
T4 FREE SERPL-MCNC: 1.2 NG/DL
TRIGL SERPL-MCNC: 627 MG/DL
TSH SERPL-ACNC: 0.94 UIU/ML
VIT B12 SERPL-MCNC: 811 PG/ML

## 2021-04-11 LAB — PANC ISLET CELL AB SER QL: NORMAL

## 2021-04-12 LAB — GAD65 AB SER-MCNC: 0 NMOL/L

## 2021-04-19 ENCOUNTER — APPOINTMENT (OUTPATIENT)
Dept: ENDOCRINOLOGY | Facility: CLINIC | Age: 56
End: 2021-04-19
Payer: MEDICAID

## 2021-04-19 VITALS — WEIGHT: 230 LBS | HEIGHT: 70 IN | BODY MASS INDEX: 32.93 KG/M2

## 2021-04-19 PROCEDURE — G0108 DIAB MANAGE TRN  PER INDIV: CPT

## 2021-04-19 PROCEDURE — 99072 ADDL SUPL MATRL&STAF TM PHE: CPT

## 2021-04-23 LAB — ZINC TRANSPORTER 8 AB: <15 U/ML

## 2021-04-26 ENCOUNTER — RX RENEWAL (OUTPATIENT)
Age: 56
End: 2021-04-26

## 2021-05-02 ENCOUNTER — APPOINTMENT (OUTPATIENT)
Dept: INTERNAL MEDICINE | Facility: CLINIC | Age: 56
End: 2021-05-02
Payer: MEDICAID

## 2021-05-02 VITALS
HEIGHT: 70 IN | OXYGEN SATURATION: 97 % | WEIGHT: 238 LBS | DIASTOLIC BLOOD PRESSURE: 78 MMHG | HEART RATE: 73 BPM | TEMPERATURE: 98 F | RESPIRATION RATE: 18 BRPM | BODY MASS INDEX: 34.07 KG/M2 | SYSTOLIC BLOOD PRESSURE: 130 MMHG

## 2021-05-02 PROCEDURE — 99072 ADDL SUPL MATRL&STAF TM PHE: CPT

## 2021-05-02 PROCEDURE — 99406 BEHAV CHNG SMOKING 3-10 MIN: CPT | Mod: 25

## 2021-05-02 PROCEDURE — 99214 OFFICE O/P EST MOD 30 MIN: CPT | Mod: 25

## 2021-05-02 NOTE — REVIEW OF SYSTEMS
[Fatigue] : fatigue [Nocturia] : nocturia [Impotence] : impotence [Headache] : headache [Negative] : Heme/Lymph [Fever] : no fever [Chills] : no chills [Hot Flashes] : no hot flashes [Night Sweats] : no night sweats [Recent Change In Weight] : ~T no recent weight change [Abdominal Pain] : no abdominal pain [Nausea] : no nausea [Constipation] : no constipation [Diarrhea] : no diarrhea [Heartburn] : no heartburn [Vomiting] : no vomiting [Melena] : no melena [Dysuria] : no dysuria [Incontinence] : no incontinence [Hematuria] : no hematuria [Frequency] : no frequency [Itching] : no itching [Skin Rash] : no skin rash [Insomnia] : no insomnia [Suicidal] : not suicidal [Anxiety] : no anxiety [Depression] : no depression [FreeTextEntry9] : back and leg pain, hand pain, neck and shoulder pains [de-identified] : numbness as noted - neuropathy

## 2021-05-02 NOTE — HISTORY OF PRESENT ILLNESS
[FreeTextEntry1] : DM, neuropathy, hld, ED, leg/back pain [de-identified] : Pt is a 54 y/o male with a hx of DM, hld, neuropathy, ED, leg and back pain. \par He has been on medications for the DM, lipid meds On lyrica for the neuropathy.   Meds were adjusted last month with Dr Villalpando.  Now on insulin and did not start ozempic b/c of the high trig.  Has f/u next mo.  Seeing diabetic educator for f/u tomorrow. \par Taking meds.  \par diet improved.\par Has been riding 10 miles on bicycle daily.\par some wt inc\par FS - Still with some high glucose readings - mostly in the 200s - improved from prior..    \par due to f/u with ophtho.  \par \par Sleeping has been okay - variable.  \par Still with neuropathic sx. follows with Neurology - Dr Manish Holguin, also follows with pod- Dr Alicia - Reports that the neuropathic sx include pain at the leg and numbness at the toes. + burning at the feet and the back with some pinching. Pain at the toes has increased. Had no relief with gabapentin - controlled with the lyrica Some pinching/numbness sensation at the hands - s/p EMG. in the past had been given oxy by other MD. Reports that he has continued to follow.  Due for f/u.\par + still with pain at the back. s/p injection with relief. Has been increasing. \par + polyuria, with night time polydipsia - with nocturia x3. no polyphagia. \par no noted CV sx. \par no GI sx. Has been having occ GERD sx. \par no noted dizziness. no confusion.\par Flank and abd have been fine\par Reports some feeling of le edema in the afternoon.  Has f/u appt with cardiology.  \par Asking to refill topical rx for the groin\par \par Has been smoking a little.  Reducing slowly - tapering down to off. \par \par Tdap - '17\par pneumo ??\par for flu vaccine\par \par ophtho - due - Dr Ocasio\par colon - fit 1/20\par prost- 12/19

## 2021-05-02 NOTE — PHYSICAL EXAM
[No Acute Distress] : no acute distress [Well Nourished] : well nourished [Well Developed] : well developed [Well-Appearing] : well-appearing [Normal Voice/Communication] : normal voice/communication [Normal Sclera/Conjunctiva] : normal sclera/conjunctiva [PERRL] : pupils equal round and reactive to light [EOMI] : extraocular movements intact [Normal Outer Ear/Nose] : the outer ears and nose were normal in appearance [Normal Oropharynx] : the oropharynx was normal [No Lymphadenopathy] : no lymphadenopathy [No JVD] : no jugular venous distention [Supple] : supple [No Respiratory Distress] : no respiratory distress  [No Accessory Muscle Use] : no accessory muscle use [Clear to Auscultation] : lungs were clear to auscultation bilaterally [Normal Rate] : normal rate  [Regular Rhythm] : with a regular rhythm [No Murmur] : no murmur heard [Normal S1, S2] : normal S1 and S2 [No Carotid Bruits] : no carotid bruits [No Abdominal Bruit] : a ~M bruit was not heard ~T in the abdomen [No Edema] : there was no peripheral edema [Soft] : abdomen soft [Non Tender] : non-tender [Non-distended] : non-distended [No Masses] : no abdominal mass palpated [No HSM] : no HSM [Normal Bowel Sounds] : normal bowel sounds [Normal Anterior Cervical Nodes] : no anterior cervical lymphadenopathy [Normal Posterior Cervical Nodes] : no posterior cervical lymphadenopathy [No CVA Tenderness] : no CVA  tenderness [No Spinal Tenderness] : no spinal tenderness [No Joint Swelling] : no joint swelling [Grossly Normal Strength/Tone] : grossly normal strength/tone [Coordination Grossly Intact] : coordination grossly intact [No Focal Deficits] : no focal deficits [Normal Gait] : normal gait [Speech Grossly Normal] : speech grossly normal [Memory Grossly Normal] : memory grossly normal [Normal Affect] : the affect was normal [Alert and Oriented x3] : oriented to person, place, and time [Normal Mood] : the mood was normal [Normal Insight/Judgement] : insight and judgment were intact [de-identified] : scar at the lower forehead [de-identified] : had recent testing with endocrinology.  Following with pod and neurology.

## 2021-05-02 NOTE — COUNSELING
[Potential consequences of obesity discussed] : Potential consequences of obesity discussed [Benefits of weight loss discussed] : Benefits of weight loss discussed [Encouraged to increase physical activity] : Encouraged to increase physical activity [Decrease Portions] : decrease portions [None] : None [Good understanding] : Patient has a good understanding of lifestyle changes and steps needed to achieve self management goal [Risk of tobacco use and health benefits of smoking cessation discussed] : Risk of tobacco use and health benefits of smoking cessation discussed [Cessation strategies including cessation program discussed] : Cessation strategies including cessation program discussed [Use of nicotine replacement therapies and other medications discussed] : Use of nicotine replacement therapies and other medications discussed [Encouraged to pick a quit date and identify support needed to quit] : Encouraged to pick a quit date and identify support needed to quit [Willing to Quit Smoking] : Willing to quit smoking [Tobacco Use Cessation Intermediate Greater Than 3 Minutes Up to 10 Minutes] : Tobacco Use Cessation Intermediate Greater Than 3 Minutes Up to 10 Minutes [FreeTextEntry1] : 4

## 2021-05-02 NOTE — HEALTH RISK ASSESSMENT
[Yes] : Yes [Monthly or less (1 pt)] : Monthly or less (1 point) [1 or 2 (0 pts)] : 1 or 2 (0 points) [Never (0 pts)] : Never (0 points) [No] : In the past 12 months have you used drugs other than those required for medical reasons? No [0] : 2) Feeling down, depressed, or hopeless: Not at all (0) [] : No [Audit-CScore] : 1 [[id number]] : 0

## 2021-05-03 ENCOUNTER — APPOINTMENT (OUTPATIENT)
Dept: ENDOCRINOLOGY | Facility: CLINIC | Age: 56
End: 2021-05-03

## 2021-06-01 LAB — HEMOCCULT STL QL IA: NEGATIVE

## 2021-06-08 ENCOUNTER — APPOINTMENT (OUTPATIENT)
Dept: ENDOCRINOLOGY | Facility: CLINIC | Age: 56
End: 2021-06-08
Payer: MEDICAID

## 2021-06-08 VITALS
HEART RATE: 91 BPM | TEMPERATURE: 97.4 F | WEIGHT: 151 LBS | SYSTOLIC BLOOD PRESSURE: 118 MMHG | DIASTOLIC BLOOD PRESSURE: 78 MMHG | OXYGEN SATURATION: 97 % | BODY MASS INDEX: 21.62 KG/M2 | HEIGHT: 70 IN

## 2021-06-08 LAB — GLUCOSE BLDC GLUCOMTR-MCNC: 224

## 2021-06-08 PROCEDURE — 95251 CONT GLUC MNTR ANALYSIS I&R: CPT

## 2021-06-08 PROCEDURE — 99214 OFFICE O/P EST MOD 30 MIN: CPT | Mod: 25

## 2021-06-08 RX ORDER — GLIPIZIDE 10 MG/1
10 TABLET ORAL
Qty: 180 | Refills: 1 | Status: DISCONTINUED | COMMUNITY
Start: 2019-01-07 | End: 2021-06-08

## 2021-06-08 NOTE — ASSESSMENT
[Diabetes Foot Care] : diabetes foot care [Long Term Vascular Complications] : long term vascular complications of diabetes [Carbohydrate Consistent Diet] : carbohydrate consistent diet [Importance of Diet and Exercise] : importance of diet and exercise to improve glycemic control, achieve weight loss and improve cardiovascular health [Exercise/Effect on Glucose] : exercise/effect on glucose [Hypoglycemia Management] : hypoglycemia management [Glucagon Use] : glucagon use [Action and use of Insulin] : action and use of short and long-acting insulin [Self Monitoring of Blood Glucose] : self monitoring of blood glucose [Insulin Self-Administration] : insulin self-administration [Injection Technique, Storage, Sharps Disposal] : injection technique, storage, and sharps disposal [Sick-Day Management] : sick-day management [Retinopathy Screening] : Patient was referred to ophthalmology for retinopathy screening [Smoking Cessation] : smoking cessation [Weight Loss] : weight loss [Diabetic Medications] : Risks and benefits of diabetic medications were discussed [FreeTextEntry1] : - check lipids today, if ok TG, will add ozempic\par - Basaglar 60 un HS\par - Admelog 30-35-35\par - cont metformin 1000 mg bid, actos 30 mg, Januvia 100 mg qd\par - monitor BP, lipids, urine microalbumin\par - cont lipitor, lopid, fish oil\par - smoking cessation\par - elective cardiology evaluation\par RTC 3 mos, CDE in between\par

## 2021-06-08 NOTE — HISTORY OF PRESENT ILLNESS
[FreeTextEntry1] : F/u for diabetes management\par \par *** Jun 08, 2021 ***\par \par on Basaglar 55 un HS,  Admelog 25 un tid ac, metformin 1000 mg bid, actos 30 mg, januvia 100 mg\par taking fish oil, but stopped lopid\par feels better, but is concerned with a weight gain. no leg swelling\par \par wearing Pa\par Date of download:  6/8/21\par Diabetes Medications and Dosage: as above\par Indication for CGMS: verify a change in the treatment regimen, identify periods of hypoglycemia/ hyperglycemia. \par Modal day report: pattern. \par Pt with HYPO  0% of the time (NONE below 54), 44 % in target range\par Hyperglycemia:  56% elevation \par Identified issues: carbohydrate counting, post prandial spikes\par dates analyzed:6/2/21-6/8/21\par \par \par \par HISTORY OF PRESENT ILLNESS. \par \par Mr. JARAMILLO was diagnosed with Diabetes Mellitus Type 2 more than 10 years ago. He reports history HTN, dyslipidemia, neuropathy  (on lyrica) and denies CAD,  known complications of retinopathy, nephropathy. He is presently on Glipizide 10 mg qd, Nesina 25 mg qd,  Januvia 100 mg qd (? if on both Nesina and Januvia), metformin 1000 mg bid.\par Blood sugars are not checked at home.\par Hypoglycemia frequency: none\par Fingerstick glucose in the office today is 316 mg/dL (fasting)\par Diet: not following ADA\par Exercise: none\par \par Lab review: a1c- 11.3\par \par Last dilated eye - 2020\par Last podiatry visit  - 2020 \par Last cardiology evaluation - none\par Last stress test - none\par Last 2-D Echo -none\par Last nephrology evaluation - none\par Last neurology evaluation - 2020

## 2021-06-10 LAB
ALBUMIN SERPL ELPH-MCNC: 4.6 G/DL
ALP BLD-CCNC: 84 U/L
ALT SERPL-CCNC: 28 U/L
ANION GAP SERPL CALC-SCNC: 14 MMOL/L
AST SERPL-CCNC: 18 U/L
BILIRUB SERPL-MCNC: 0.5 MG/DL
BUN SERPL-MCNC: 22 MG/DL
CALCIUM SERPL-MCNC: 9.9 MG/DL
CHLORIDE SERPL-SCNC: 100 MMOL/L
CHOLEST SERPL-MCNC: 134 MG/DL
CO2 SERPL-SCNC: 21 MMOL/L
CREAT SERPL-MCNC: 0.62 MG/DL
GLUCOSE SERPL-MCNC: 228 MG/DL
HDLC SERPL-MCNC: 26 MG/DL
LDLC SERPL CALC-MCNC: 30 MG/DL
NONHDLC SERPL-MCNC: 108 MG/DL
POTASSIUM SERPL-SCNC: 5.2 MMOL/L
PROT SERPL-MCNC: 6.8 G/DL
SODIUM SERPL-SCNC: 135 MMOL/L
TRIGL SERPL-MCNC: 390 MG/DL

## 2021-06-21 ENCOUNTER — RX RENEWAL (OUTPATIENT)
Age: 56
End: 2021-06-21

## 2021-07-07 ENCOUNTER — APPOINTMENT (OUTPATIENT)
Dept: CARDIOLOGY | Facility: CLINIC | Age: 56
End: 2021-07-07

## 2021-07-21 ENCOUNTER — APPOINTMENT (OUTPATIENT)
Dept: ENDOCRINOLOGY | Facility: CLINIC | Age: 56
End: 2021-07-21
Payer: MEDICAID

## 2021-07-21 VITALS — HEIGHT: 70 IN | BODY MASS INDEX: 21.62 KG/M2 | WEIGHT: 151 LBS

## 2021-07-21 PROCEDURE — G0108 DIAB MANAGE TRN  PER INDIV: CPT

## 2021-08-08 ENCOUNTER — APPOINTMENT (OUTPATIENT)
Dept: INTERNAL MEDICINE | Facility: CLINIC | Age: 56
End: 2021-08-08

## 2021-08-25 ENCOUNTER — APPOINTMENT (OUTPATIENT)
Dept: ENDOCRINOLOGY | Facility: CLINIC | Age: 56
End: 2021-08-25

## 2021-09-13 ENCOUNTER — RX RENEWAL (OUTPATIENT)
Age: 56
End: 2021-09-13

## 2021-11-02 ENCOUNTER — LABORATORY RESULT (OUTPATIENT)
Age: 56
End: 2021-11-02

## 2021-11-02 ENCOUNTER — APPOINTMENT (OUTPATIENT)
Dept: INTERNAL MEDICINE | Facility: CLINIC | Age: 56
End: 2021-11-02
Payer: MEDICAID

## 2021-11-02 VITALS
SYSTOLIC BLOOD PRESSURE: 122 MMHG | BODY MASS INDEX: 37.51 KG/M2 | DIASTOLIC BLOOD PRESSURE: 76 MMHG | OXYGEN SATURATION: 97 % | RESPIRATION RATE: 16 BRPM | WEIGHT: 262 LBS | HEART RATE: 91 BPM | HEIGHT: 70 IN

## 2021-11-02 DIAGNOSIS — Z23 ENCOUNTER FOR IMMUNIZATION: ICD-10-CM

## 2021-11-02 DIAGNOSIS — S90.822A BLISTER (NONTHERMAL), LEFT FOOT, INITIAL ENCOUNTER: ICD-10-CM

## 2021-11-02 PROCEDURE — 99406 BEHAV CHNG SMOKING 3-10 MIN: CPT | Mod: 25

## 2021-11-02 PROCEDURE — G0008: CPT

## 2021-11-02 PROCEDURE — 99214 OFFICE O/P EST MOD 30 MIN: CPT | Mod: 25

## 2021-11-02 PROCEDURE — 90686 IIV4 VACC NO PRSV 0.5 ML IM: CPT

## 2021-11-02 NOTE — PHYSICAL EXAM
[No Acute Distress] : no acute distress [Well Nourished] : well nourished [Well Developed] : well developed [Well-Appearing] : well-appearing [Normal Voice/Communication] : normal voice/communication [Normal Sclera/Conjunctiva] : normal sclera/conjunctiva [PERRL] : pupils equal round and reactive to light [EOMI] : extraocular movements intact [Normal Outer Ear/Nose] : the outer ears and nose were normal in appearance [Normal Oropharynx] : the oropharynx was normal [No JVD] : no jugular venous distention [No Lymphadenopathy] : no lymphadenopathy [Supple] : supple [No Respiratory Distress] : no respiratory distress  [No Accessory Muscle Use] : no accessory muscle use [Clear to Auscultation] : lungs were clear to auscultation bilaterally [Normal Rate] : normal rate  [Regular Rhythm] : with a regular rhythm [Normal S1, S2] : normal S1 and S2 [No Murmur] : no murmur heard [No Carotid Bruits] : no carotid bruits [No Abdominal Bruit] : a ~M bruit was not heard ~T in the abdomen [No Edema] : there was no peripheral edema [Soft] : abdomen soft [Non Tender] : non-tender [Non-distended] : non-distended [No Masses] : no abdominal mass palpated [No HSM] : no HSM [Normal Bowel Sounds] : normal bowel sounds [Normal Posterior Cervical Nodes] : no posterior cervical lymphadenopathy [Normal Anterior Cervical Nodes] : no anterior cervical lymphadenopathy [No CVA Tenderness] : no CVA  tenderness [No Spinal Tenderness] : no spinal tenderness [No Joint Swelling] : no joint swelling [Grossly Normal Strength/Tone] : grossly normal strength/tone [Coordination Grossly Intact] : coordination grossly intact [No Focal Deficits] : no focal deficits [Normal Gait] : normal gait [Speech Grossly Normal] : speech grossly normal [Memory Grossly Normal] : memory grossly normal [Normal Affect] : the affect was normal [Alert and Oriented x3] : oriented to person, place, and time [Normal Mood] : the mood was normal [Normal Insight/Judgement] : insight and judgment were intact [de-identified] : obese [de-identified] : scar at the lower forehead [de-identified] : had recent testing with endocrinology.  Following with pod and neurology.

## 2021-11-02 NOTE — HISTORY OF PRESENT ILLNESS
[FreeTextEntry1] : DM, neuropathy, hld, ED, leg/back pain [de-identified] : Pt is a 56 y/o male with a hx of DM, hld, neuropathy, ED, leg and back pain. \par He has been on medications for the DM, lipid meds On lyrica for the neuropathy.   \par Has followed up with Dr Villalpando and with diabetic educator.  Labs from last endocrinology visit reviewed.  \par Taking meds.  \par diet improved.\par Has been riding bicycle.\par some wt inc\par FS -  Reported as high\par due to f/u with ophtho.  \par \par Sleeping has been okay - variable.  \par Still with neuropathic sx. follows with Neurology - Dr Manish Holguin, also follows with pod- Dr Alicia - Reports that the neuropathic sx include pain at the leg and numbness at the toes. + burning at the feet and the back with some pinching. Pain at the toes has increased. Had no relief with gabapentin - controlled with the lyrica Some pinching/numbness sensation at the hands - s/p EMG. in the past had been given oxy by other MD. Reports that he has continued to follow.  Due for f/u.\par + still with pain at the back. s/p injection with relief. Has been increasing. \par + polyuria, with night time polydipsia - with nocturia x3. no polyphagia. \par no noted CV sx. \par no GI sx. Has been having occ GERD sx. \par no noted dizziness. no confusion.\par Flank and abd have been fine\par Reports some feeling of le edema in the afternoon.  follows with cardiology.  \par with ? new blister at the dorsal left foot.  no pain or pruritus.  \par \par Has been smoking a little.  Reducing slowly - tapering down to off. \par \par Tdap - '17\par pneumo ??\par for flu vaccine\par \par ophtho - due - Dr Ocasio\par colon - fit 5/21\par prost- 12/19

## 2021-11-02 NOTE — COUNSELING
[Risk of tobacco use and health benefits of smoking cessation discussed] : Risk of tobacco use and health benefits of smoking cessation discussed [Cessation strategies including cessation program discussed] : Cessation strategies including cessation program discussed [Use of nicotine replacement therapies and other medications discussed] : Use of nicotine replacement therapies and other medications discussed [Encouraged to pick a quit date and identify support needed to quit] : Encouraged to pick a quit date and identify support needed to quit [Willing to Quit Smoking] : Willing to quit smoking [Tobacco Use Cessation Intermediate Greater Than 3 Minutes Up to 10 Minutes] : Tobacco Use Cessation Intermediate Greater Than 3 Minutes Up to 10 Minutes [None] : None [Potential consequences of obesity discussed] : Potential consequences of obesity discussed [Benefits of weight loss discussed] : Benefits of weight loss discussed [Encouraged to increase physical activity] : Encouraged to increase physical activity [Decrease Portions] : decrease portions [Good understanding] : Patient has a good understanding of disease, goals and obesity follow-up plan [FreeTextEntry1] : 4

## 2021-11-02 NOTE — REVIEW OF SYSTEMS
[Fatigue] : fatigue [Nocturia] : nocturia [Impotence] : impotence [Headache] : headache [Negative] : Heme/Lymph [Fever] : no fever [Chills] : no chills [Hot Flashes] : no hot flashes [Night Sweats] : no night sweats [Recent Change In Weight] : ~T no recent weight change [Abdominal Pain] : no abdominal pain [Nausea] : no nausea [Constipation] : no constipation [Diarrhea] : no diarrhea [Vomiting] : no vomiting [Heartburn] : no heartburn [Melena] : no melena [Dysuria] : no dysuria [Incontinence] : no incontinence [Hematuria] : no hematuria [Frequency] : no frequency [Itching] : no itching [Skin Rash] : no skin rash [Suicidal] : not suicidal [Insomnia] : no insomnia [Anxiety] : no anxiety [Depression] : no depression [FreeTextEntry9] : back and leg pain, hand pain, neck and shoulder pains [de-identified] : numbness as noted - neuropathy

## 2021-11-02 NOTE — HEALTH RISK ASSESSMENT
[] : Yes [Yes] : Yes [Monthly or less (1 pt)] : Monthly or less (1 point) [1 or 2 (0 pts)] : 1 or 2 (0 points) [Never (0 pts)] : Never (0 points) [No] : In the past 12 months have you used drugs other than those required for medical reasons? No [0] : 2) Feeling down, depressed, or hopeless: Not at all (0) [PHQ-2 Negative - No further assessment needed] : PHQ-2 Negative - No further assessment needed [Audit-CScore] : 1 [[id number]] : 0

## 2021-11-03 LAB
ALBUMIN SERPL ELPH-MCNC: 4.5 G/DL
ALP BLD-CCNC: 108 U/L
ALT SERPL-CCNC: 23 U/L
ANION GAP SERPL CALC-SCNC: 14 MMOL/L
AST SERPL-CCNC: 12 U/L
BASOPHILS # BLD AUTO: 0.06 K/UL
BASOPHILS NFR BLD AUTO: 0.6 %
BILIRUB SERPL-MCNC: 0.5 MG/DL
BUN SERPL-MCNC: 19 MG/DL
CALCIUM SERPL-MCNC: 9.9 MG/DL
CHLORIDE SERPL-SCNC: 99 MMOL/L
CHOLEST SERPL-MCNC: 162 MG/DL
CO2 SERPL-SCNC: 24 MMOL/L
CREAT SERPL-MCNC: 0.7 MG/DL
CREAT SPEC-SCNC: 176 MG/DL
EOSINOPHIL # BLD AUTO: 0.31 K/UL
EOSINOPHIL NFR BLD AUTO: 3.4 %
ESTIMATED AVERAGE GLUCOSE: 203 MG/DL
FRUCTOSAMINE SERPL-MCNC: 367 UMOL/L
GLUCOSE SERPL-MCNC: 319 MG/DL
HBA1C MFR BLD HPLC: 8.7 %
HCT VFR BLD CALC: 45.8 %
HDLC SERPL-MCNC: 24 MG/DL
HGB BLD-MCNC: 14.4 G/DL
IMM GRANULOCYTES NFR BLD AUTO: 0.5 %
LDLC SERPL CALC-MCNC: NORMAL MG/DL
LYMPHOCYTES # BLD AUTO: 1.15 K/UL
LYMPHOCYTES NFR BLD AUTO: 12.4 %
MAN DIFF?: NORMAL
MCHC RBC-ENTMCNC: 29.3 PG
MCHC RBC-ENTMCNC: 31.4 GM/DL
MCV RBC AUTO: 93.3 FL
MICROALBUMIN 24H UR DL<=1MG/L-MCNC: 92 MG/DL
MICROALBUMIN/CREAT 24H UR-RTO: 523 MG/G
MONOCYTES # BLD AUTO: 0.6 K/UL
MONOCYTES NFR BLD AUTO: 6.5 %
NEUTROPHILS # BLD AUTO: 7.08 K/UL
NEUTROPHILS NFR BLD AUTO: 76.6 %
NONHDLC SERPL-MCNC: 138 MG/DL
PLATELET # BLD AUTO: 263 K/UL
POTASSIUM SERPL-SCNC: 5.1 MMOL/L
PROT SERPL-MCNC: 7.2 G/DL
PSA SERPL-MCNC: 0.57 NG/ML
RBC # BLD: 4.91 M/UL
RBC # FLD: 14.3 %
SODIUM SERPL-SCNC: 137 MMOL/L
TRIGL SERPL-MCNC: 749 MG/DL
WBC # FLD AUTO: 9.25 K/UL

## 2021-11-08 ENCOUNTER — RX RENEWAL (OUTPATIENT)
Age: 56
End: 2021-11-08

## 2021-11-18 ENCOUNTER — APPOINTMENT (OUTPATIENT)
Dept: ENDOCRINOLOGY | Facility: CLINIC | Age: 56
End: 2021-11-18
Payer: MEDICAID

## 2021-11-18 VITALS
RESPIRATION RATE: 7 BRPM | HEART RATE: 101 BPM | BODY MASS INDEX: 37.51 KG/M2 | OXYGEN SATURATION: 96 % | DIASTOLIC BLOOD PRESSURE: 60 MMHG | WEIGHT: 262 LBS | TEMPERATURE: 97.5 F | HEIGHT: 70 IN | SYSTOLIC BLOOD PRESSURE: 120 MMHG

## 2021-11-18 LAB — GLUCOSE BLDC GLUCOMTR-MCNC: 286

## 2021-11-18 PROCEDURE — 99214 OFFICE O/P EST MOD 30 MIN: CPT | Mod: 25

## 2021-11-18 PROCEDURE — 95251 CONT GLUC MNTR ANALYSIS I&R: CPT

## 2021-11-18 PROCEDURE — 82962 GLUCOSE BLOOD TEST: CPT

## 2021-11-18 RX ORDER — INSULIN GLARGINE 100 [IU]/ML
100 INJECTION, SOLUTION SUBCUTANEOUS
Qty: 5 | Refills: 6 | Status: DISCONTINUED | COMMUNITY
Start: 2021-04-08 | End: 2021-11-18

## 2021-11-18 RX ORDER — INSULIN LISPRO 100 U/ML
100 INJECTION, SOLUTION SUBCUTANEOUS
Qty: 3 | Refills: 6 | Status: DISCONTINUED | COMMUNITY
Start: 2021-04-08 | End: 2021-11-18

## 2021-11-18 RX ORDER — AMITRIPTYLINE HYDROCHLORIDE 75 MG/1
75 TABLET, FILM COATED ORAL
Qty: 30 | Refills: 3 | Status: DISCONTINUED | COMMUNITY
End: 2021-11-18

## 2021-11-18 NOTE — ASSESSMENT
[Diabetes Foot Care] : diabetes foot care [Long Term Vascular Complications] : long term vascular complications of diabetes [Carbohydrate Consistent Diet] : carbohydrate consistent diet [Importance of Diet and Exercise] : importance of diet and exercise to improve glycemic control, achieve weight loss and improve cardiovascular health [Exercise/Effect on Glucose] : exercise/effect on glucose [Hypoglycemia Management] : hypoglycemia management [Glucagon Use] : glucagon use [Action and use of Insulin] : action and use of short and long-acting insulin [Self Monitoring of Blood Glucose] : self monitoring of blood glucose [Insulin Self-Administration] : insulin self-administration [Injection Technique, Storage, Sharps Disposal] : injection technique, storage, and sharps disposal [Sick-Day Management] : sick-day management [Retinopathy Screening] : Patient was referred to ophthalmology for retinopathy screening [Smoking Cessation] : smoking cessation [Weight Loss] : weight loss [Diabetic Medications] : Risks and benefits of diabetic medications were discussed [FreeTextEntry1] : - will wait with GLP1 until TG imrpove\par - d/c Basaglar and  Admelog\par - Humulin R U-500 80-60-70\par - cont metformin 1000 mg bid, actos 30 mg, Januvia 100 mg qd\par - add Jardiance 10 mg qd\par - monitor BP, lipids, urine microalbumin\par - cont lipitor, lopid, fish oil\par - smoking cessation\par - again advised on elective cardiology evaluation; he missed his last appt\par RTC 3 mos, CDE in between\par

## 2021-11-18 NOTE — HISTORY OF PRESENT ILLNESS
[FreeTextEntry1] : F/u for diabetes management\par \par *** Nov 18, 2021 ***\par \par taking Basaglar 58 un HS,  Admelog 40 un tid ac,  metformin 1000 mg bid, actos 30 mg, Januvia 100 mg qd\par off fish oil\par a1c- 8.7, TG- 749\par Date of download: 11/18/21 \par Diabetes Medications and Dosage: as above\par Indication for CGMS: verify a change in the treatment regimen, identify periods of hypoglycemia/ hyperglycemia. \par Modal day report: pattern. \par Pt with HYPO  0% of the time (NONE below 54),  27% in target range\par Hyperglycemia: 73 % elevation \par Identified issues: carbohydrate counting\par dates analyzed:11/5/21-11/18/21\par \par *** Jun 08, 2021 ***\par \par on Basaglar 55 un HS,  Admelog 25 un tid ac, metformin 1000 mg bid, actos 30 mg, januvia 100 mg\par taking fish oil, but stopped lopid\par feels better, but is concerned with a weight gain. no leg swelling\par \par wearing Pa\par Date of download:  6/8/21\par Diabetes Medications and Dosage: as above\par Indication for CGMS: verify a change in the treatment regimen, identify periods of hypoglycemia/ hyperglycemia. \par Modal day report: pattern. \par Pt with HYPO  0% of the time (NONE below 54), 44 % in target range\par Hyperglycemia:  56% elevation \par Identified issues: carbohydrate counting, post prandial spikes\par dates analyzed:6/2/21-6/8/21\par \par \par \par HISTORY OF PRESENT ILLNESS. \par \par Mr. JARAMILLO was diagnosed with Diabetes Mellitus Type 2 more than 10 years ago. He reports history HTN, dyslipidemia, neuropathy  (on lyrica) and denies CAD,  known complications of retinopathy, nephropathy. He is presently on Glipizide 10 mg qd, Nesina 25 mg qd,  Januvia 100 mg qd (? if on both Nesina and Januvia), metformin 1000 mg bid.\par Blood sugars are not checked at home.\par Hypoglycemia frequency: none\par Fingerstick glucose in the office today is 316 mg/dL (fasting)\par Diet: not following ADA\par Exercise: none\par \par Lab review: a1c- 11.3\par \par Last dilated eye - 2020\par Last podiatry visit  - 11/21\par Last cardiology evaluation - none\par Last stress test - none\par Last 2-D Echo -none\par Last nephrology evaluation - none\par Last neurology evaluation - 2020

## 2022-02-03 ENCOUNTER — RX RENEWAL (OUTPATIENT)
Age: 57
End: 2022-02-03

## 2022-02-08 ENCOUNTER — APPOINTMENT (OUTPATIENT)
Dept: INTERNAL MEDICINE | Facility: CLINIC | Age: 57
End: 2022-02-08

## 2022-03-08 ENCOUNTER — RX RENEWAL (OUTPATIENT)
Age: 57
End: 2022-03-08

## 2022-03-31 ENCOUNTER — RX RENEWAL (OUTPATIENT)
Age: 57
End: 2022-03-31

## 2022-04-14 ENCOUNTER — RX RENEWAL (OUTPATIENT)
Age: 57
End: 2022-04-14

## 2022-04-14 RX ORDER — PREGABALIN 300 MG/1
300 CAPSULE ORAL TWICE DAILY
Qty: 60 | Refills: 0 | Status: ACTIVE | COMMUNITY
Start: 2020-12-27 | End: 1900-01-01

## 2022-04-21 ENCOUNTER — APPOINTMENT (OUTPATIENT)
Dept: ENDOCRINOLOGY | Facility: CLINIC | Age: 57
End: 2022-04-21
Payer: MEDICAID

## 2022-04-21 ENCOUNTER — LABORATORY RESULT (OUTPATIENT)
Age: 57
End: 2022-04-21

## 2022-04-21 VITALS
HEART RATE: 88 BPM | TEMPERATURE: 97.5 F | SYSTOLIC BLOOD PRESSURE: 140 MMHG | HEIGHT: 70 IN | BODY MASS INDEX: 2.28 KG/M2 | OXYGEN SATURATION: 97 % | WEIGHT: 15.94 LBS | DIASTOLIC BLOOD PRESSURE: 70 MMHG

## 2022-04-21 LAB
ESTIMATED AVERAGE GLUCOSE: 226 MG/DL
FRUCTOSAMINE SERPL-MCNC: 302 UMOL/L
GLUCOSE BLDC GLUCOMTR-MCNC: 243
HBA1C MFR BLD HPLC: 9.5 %

## 2022-04-21 PROCEDURE — 99214 OFFICE O/P EST MOD 30 MIN: CPT | Mod: 25

## 2022-04-21 PROCEDURE — 82962 GLUCOSE BLOOD TEST: CPT

## 2022-04-21 PROCEDURE — 95251 CONT GLUC MNTR ANALYSIS I&R: CPT

## 2022-04-21 RX ORDER — MELOXICAM 15 MG/1
15 TABLET ORAL DAILY
Qty: 30 | Refills: 1 | Status: DISCONTINUED | COMMUNITY
Start: 2021-05-02 | End: 2022-04-21

## 2022-04-21 NOTE — ASSESSMENT
[Diabetes Foot Care] : diabetes foot care [Long Term Vascular Complications] : long term vascular complications of diabetes [Carbohydrate Consistent Diet] : carbohydrate consistent diet [Importance of Diet and Exercise] : importance of diet and exercise to improve glycemic control, achieve weight loss and improve cardiovascular health [Exercise/Effect on Glucose] : exercise/effect on glucose [Hypoglycemia Management] : hypoglycemia management [Glucagon Use] : glucagon use [Action and use of Insulin] : action and use of short and long-acting insulin [Self Monitoring of Blood Glucose] : self monitoring of blood glucose [Insulin Self-Administration] : insulin self-administration [Injection Technique, Storage, Sharps Disposal] : injection technique, storage, and sharps disposal [Sick-Day Management] : sick-day management [Retinopathy Screening] : Patient was referred to ophthalmology for retinopathy screening [Smoking Cessation] : smoking cessation [Weight Loss] : weight loss [Diabetic Medications] : Risks and benefits of diabetic medications were discussed [FreeTextEntry1] : - will wait with GLP1 until TG improve\par - Humulin R U-500 85-0-85\par - cont metformin 1000 mg bid, actos 30 mg, Januvia 100 mg qd (will stop if goes on GLP1RA)\par - Jardiance 10 mg qd, might increase post labs\par - monitor BP, lipids, urine microalbumin\par - cont lipitor, lopid, fish oil\par - smoking cessation\par - again advised on elective cardiology evaluation; he missed his last appt\par RTC 3 mos, CDE in between\par

## 2022-04-21 NOTE — HISTORY OF PRESENT ILLNESS
[FreeTextEntry1] : F/u for diabetes management\par \par *** Apr 21, 2022 ***\par \par taking Humulin R U-500 70-0-70 only,  metformin 1000 mg bid, actos 30 mg, Januvia 100 mg qd,  Jardiance 10 mg qd, lipitor, lopid, fish oil\par Date of download:  4/21/22\par Diabetes Medications and Dosage: as above\par Indication for CGMS: verify a change in the treatment regimen, identify periods of hypoglycemia/ hyperglycemia. \par Modal day report: pattern. \par Pt with HYPO  0% of the time (NONE below 54),  29% in target range\par Hyperglycemia:  71% elevation \par Identified issues: carbohydrate counting\par dates analyzed: 3/31/22-4/13/22\par \par \par \par *** Nov 18, 2021 ***\par \par taking Basaglar 58 un HS,  Admelog 40 un tid ac,  metformin 1000 mg bid, actos 30 mg, Januvia 100 mg qd\par off fish oil\par a1c- 8.7, TG- 749\par Date of download: 11/18/21 \par Diabetes Medications and Dosage: as above\par Indication for CGMS: verify a change in the treatment regimen, identify periods of hypoglycemia/ hyperglycemia. \par Modal day report: pattern. \par Pt with HYPO  0% of the time (NONE below 54),  27% in target range\par Hyperglycemia: 73 % elevation \par Identified issues: carbohydrate counting\par dates analyzed:11/5/21-11/18/21\par \par *** Jun 08, 2021 ***\par \par on Basaglar 55 un HS,  Admelog 25 un tid ac, metformin 1000 mg bid, actos 30 mg, januvia 100 mg\par taking fish oil, but stopped lopid\par feels better, but is concerned with a weight gain. no leg swelling\par \par wearing Pa\par Date of download:  6/8/21\par Diabetes Medications and Dosage: as above\par Indication for CGMS: verify a change in the treatment regimen, identify periods of hypoglycemia/ hyperglycemia. \par Modal day report: pattern. \par Pt with HYPO  0% of the time (NONE below 54), 44 % in target range\par Hyperglycemia:  56% elevation \par Identified issues: carbohydrate counting, post prandial spikes\par dates analyzed:6/2/21-6/8/21\par \par \par \par HISTORY OF PRESENT ILLNESS. \par \par Mr. JARAMILLO was diagnosed with Diabetes Mellitus Type 2 more than 10 years ago. He reports history HTN, dyslipidemia, neuropathy  (on lyrica) and denies CAD,  known complications of retinopathy, nephropathy. He is presently on Glipizide 10 mg qd, Nesina 25 mg qd,  Januvia 100 mg qd (? if on both Nesina and Januvia), metformin 1000 mg bid.\par Blood sugars are not checked at home.\par Hypoglycemia frequency: none\par Fingerstick glucose in the office today is 316 mg/dL (fasting)\par Diet: not following ADA\par Exercise: none\par \par Lab review: a1c- 11.3\par \par Last dilated eye - 2020\par Last podiatry visit  - 11/21\par Last cardiology evaluation - none\par Last stress test - none\par Last 2-D Echo -none\par Last nephrology evaluation - none\par Last neurology evaluation - 2020

## 2022-04-23 NOTE — PHYSICAL EXAM
[No Acute Distress] : no acute distress [Well Nourished] : well nourished [Well-Appearing] : well-appearing [Well Developed] : well developed [EOMI] : extraocular movements intact [PERRL] : pupils equal round and reactive to light [Normal Sclera/Conjunctiva] : normal sclera/conjunctiva [Normal Oropharynx] : the oropharynx was normal [Normal Outer Ear/Nose] : the outer ears and nose were normal in appearance [No Lymphadenopathy] : no lymphadenopathy [No JVD] : no jugular venous distention [Supple] : supple [Thyroid Normal, No Nodules] : the thyroid was normal and there were no nodules present [Clear to Auscultation] : lungs were clear to auscultation bilaterally [No Accessory Muscle Use] : no accessory muscle use [No Respiratory Distress] : no respiratory distress  [Regular Rhythm] : with a regular rhythm [Normal Rate] : normal rate  [Normal S1, S2] : normal S1 and S2 [No Murmur] : no murmur heard [No Carotid Bruits] : no carotid bruits [No Abdominal Bruit] : a ~M bruit was not heard ~T in the abdomen [Pedal Pulses Present] : the pedal pulses are present [No Edema] : there was no peripheral edema [Non Tender] : non-tender [Soft] : abdomen soft [No HSM] : no HSM [No Masses] : no abdominal mass palpated [Non-distended] : non-distended Single [Normal Bowel Sounds] : normal bowel sounds [Normal Posterior Cervical Nodes] : no posterior cervical lymphadenopathy [Normal Anterior Cervical Nodes] : no anterior cervical lymphadenopathy [No CVA Tenderness] : no CVA  tenderness [No Spinal Tenderness] : no spinal tenderness [Grossly Normal Strength/Tone] : grossly normal strength/tone [No Joint Swelling] : no joint swelling [No Rash] : no rash [Coordination Grossly Intact] : coordination grossly intact [Speech Grossly Normal] : speech grossly normal [No Focal Deficits] : no focal deficits [Normal Gait] : normal gait [Memory Grossly Normal] : memory grossly normal [Normal Affect] : the affect was normal [Normal Mood] : the mood was normal [Normal Insight/Judgement] : insight and judgment were intact [Alert and Oriented x3] : oriented to person, place, and time [de-identified] : small umbilical hernia [de-identified] : following with pod

## 2022-04-27 LAB
25(OH)D3 SERPL-MCNC: 12.8 NG/ML
ALBUMIN SERPL ELPH-MCNC: 4.4 G/DL
ALP BLD-CCNC: 98 U/L
ALT SERPL-CCNC: 30 U/L
ANION GAP SERPL CALC-SCNC: 15 MMOL/L
AST SERPL-CCNC: 19 U/L
BILIRUB SERPL-MCNC: 0.4 MG/DL
BUN SERPL-MCNC: 19 MG/DL
CALCIUM SERPL-MCNC: 9.5 MG/DL
CHLORIDE SERPL-SCNC: 100 MMOL/L
CHOLEST SERPL-MCNC: 159 MG/DL
CO2 SERPL-SCNC: 23 MMOL/L
CREAT SERPL-MCNC: 0.62 MG/DL
CREAT SPEC-SCNC: 120 MG/DL
EGFR: 114 ML/MIN/1.73M2
FOLATE SERPL-MCNC: 17 NG/ML
GLUCOSE SERPL-MCNC: 245 MG/DL
HDLC SERPL-MCNC: 24 MG/DL
LDLC SERPL CALC-MCNC: NORMAL MG/DL
MICROALBUMIN 24H UR DL<=1MG/L-MCNC: 81.7 MG/DL
MICROALBUMIN/CREAT 24H UR-RTO: 682 MG/G
NONHDLC SERPL-MCNC: 135 MG/DL
POTASSIUM SERPL-SCNC: 4.7 MMOL/L
PROT SERPL-MCNC: 6.8 G/DL
SODIUM SERPL-SCNC: 138 MMOL/L
TRIGL SERPL-MCNC: 772 MG/DL
TSH SERPL-ACNC: 1.2 UIU/ML
VIT B12 SERPL-MCNC: 1232 PG/ML

## 2022-04-27 RX ORDER — GEMFIBROZIL 600 MG/1
600 TABLET, FILM COATED ORAL
Qty: 60 | Refills: 2 | Status: DISCONTINUED | COMMUNITY
Start: 2019-01-07 | End: 2022-04-27

## 2022-04-28 ENCOUNTER — RX RENEWAL (OUTPATIENT)
Age: 57
End: 2022-04-28

## 2022-04-30 ENCOUNTER — RX RENEWAL (OUTPATIENT)
Age: 57
End: 2022-04-30

## 2022-05-13 ENCOUNTER — RX RENEWAL (OUTPATIENT)
Age: 57
End: 2022-05-13

## 2022-06-13 ENCOUNTER — RX RENEWAL (OUTPATIENT)
Age: 57
End: 2022-06-13

## 2022-07-25 ENCOUNTER — RX RENEWAL (OUTPATIENT)
Age: 57
End: 2022-07-25

## 2022-07-31 ENCOUNTER — RX RENEWAL (OUTPATIENT)
Age: 57
End: 2022-07-31

## 2022-08-01 ENCOUNTER — APPOINTMENT (OUTPATIENT)
Dept: INTERNAL MEDICINE | Facility: CLINIC | Age: 57
End: 2022-08-01

## 2022-08-01 DIAGNOSIS — U07.1 COVID-19: ICD-10-CM

## 2022-08-01 PROCEDURE — 99214 OFFICE O/P EST MOD 30 MIN: CPT | Mod: 25,95

## 2022-08-01 PROCEDURE — 99406 BEHAV CHNG SMOKING 3-10 MIN: CPT | Mod: 95

## 2022-08-01 RX ORDER — ATORVASTATIN CALCIUM 40 MG/1
40 TABLET, FILM COATED ORAL
Qty: 30 | Refills: 0 | Status: DISCONTINUED | COMMUNITY
Start: 2019-01-07 | End: 2022-08-01

## 2022-08-01 NOTE — REVIEW OF SYSTEMS
[Fever] : fever [Chills] : chills [Fatigue] : fatigue [Night Sweats] : night sweats [Nasal Discharge] : nasal discharge [Sore Throat] : sore throat [Shortness Of Breath] : shortness of breath [Cough] : cough [Nocturia] : nocturia [Impotence] : impotence [Headache] : headache [Negative] : Heme/Lymph [Hot Flashes] : no hot flashes [Recent Change In Weight] : ~T no recent weight change [Earache] : no earache [Hearing Loss] : no hearing loss [Nosebleeds] : no nosebleeds [Postnasal Drip] : no postnasal drip [Hoarseness] : no hoarseness [Wheezing] : no wheezing [Dyspnea on Exertion] : not dyspnea on exertion [Abdominal Pain] : no abdominal pain [Nausea] : no nausea [Constipation] : no constipation [Diarrhea] : no diarrhea [Vomiting] : no vomiting [Heartburn] : no heartburn [Melena] : no melena [Dysuria] : no dysuria [Incontinence] : no incontinence [Hematuria] : no hematuria [Frequency] : no frequency [Itching] : no itching [Skin Rash] : no skin rash [Suicidal] : not suicidal [Insomnia] : no insomnia [Anxiety] : no anxiety [Depression] : no depression [FreeTextEntry9] : back and leg pain, hand pain, neck and shoulder pains [de-identified] : numbness as noted - neuropathy

## 2022-08-01 NOTE — HEALTH RISK ASSESSMENT
[Current] : Current [Yes] : Yes [Monthly or less (1 pt)] : Monthly or less (1 point) [1 or 2 (0 pts)] : 1 or 2 (0 points) [Never (0 pts)] : Never (0 points) [No] : In the past 12 months have you used drugs other than those required for medical reasons? No [0] : 2) Feeling down, depressed, or hopeless: Not at all (0) [PHQ-2 Negative - No further assessment needed] : PHQ-2 Negative - No further assessment needed [[id number]] : 0 [Audit-CScore] : 1

## 2022-08-01 NOTE — HISTORY OF PRESENT ILLNESS
[Home] : at home, [unfilled] , at the time of the visit. [Medical Office: ([facility])___] : at the medical office located in  [Verbal consent obtained from patient] : the patient, [unfilled] [Spouse] : spouse [FreeTextEntry8] : Pt is a 56 y/o male with a hx of DM, hld, neuropathy, ED, leg and back pain. \par He has been on medications for the DM, lipid meds On lyrica for the neuropathy. \par Has followed up with Dr Villalpando and with diabetic educator. Labs from last endocrinology visit reviewed. \par Taking meds. \par diet improved.\par Has been riding bicycle.\par some wt inc\par FS - Reported as high - 200s-300s\par due to f/u with ophtho. \par \par Started having sore throat and headaches 2-4 days ago.  Rapid testing +.  Was seen at urgent care yesterday - PCR pending.  + subjective fevers, but no temp.  + occ cough - + occ mild dyspnea from nasal congestion.  no GI sx.\par \par Sleeping has been okay - variable. \par Still with neuropathic sx. follows with Neurology - Dr Manish Holguin, also follows with pod- Dr Alicia - Reports that the neuropathic sx include pain at the leg and numbness at the toes. + burning at the feet and the back with some pinching. Pain at the toes has increased. Had no relief with gabapentin - controlled with the lyrica Some pinching/numbness sensation at the hands - s/p EMG. in the past had been given oxy by other MD. Reports that he has continued to follow. Due for f/u.\par + still with pain at the back. s/p injection with relief. Has been increasing. \par + polyuria, with night time polydipsia - with nocturia x3. no polyphagia. \par no noted CV sx. \par no GI sx. Has been having occ GERD sx. \par no noted dizziness. no confusion.\par Flank and abd have been fine\par Reports some feeling of le edema in the afternoon. follows with cardiology. \par with ? new blister at the dorsal left foot. no pain or pruritus. \par \par Has been smoking a little. Reducing slowly - tapering down to off. \par \par Tdap - '17\par pneumo ??\par for flu vaccine\par \par ophtho - due - Dr Ocasio\par colon - fit 5/21\par prost- 12/19

## 2022-08-01 NOTE — COUNSELING
[Cessation strategies including cessation program discussed] : Cessation strategies including cessation program discussed [Use of nicotine replacement therapies and other medications discussed] : Use of nicotine replacement therapies and other medications discussed [Encouraged to pick a quit date and identify support needed to quit] : Encouraged to pick a quit date and identify support needed to quit [Yes] : Willing to quit smoking [Potential consequences of obesity discussed] : Potential consequences of obesity discussed [Benefits of weight loss discussed] : Benefits of weight loss discussed [Encouraged to increase physical activity] : Encouraged to increase physical activity [Decrease Portions] : decrease portions [None] : None [Good understanding] : Patient has a good understanding of lifestyle changes and steps needed to achieve self management goal [FreeTextEntry1] : 4

## 2022-08-09 ENCOUNTER — APPOINTMENT (OUTPATIENT)
Dept: ENDOCRINOLOGY | Facility: CLINIC | Age: 57
End: 2022-08-09

## 2022-08-09 VITALS
HEART RATE: 98 BPM | DIASTOLIC BLOOD PRESSURE: 66 MMHG | RESPIRATION RATE: 17 BRPM | HEIGHT: 70 IN | TEMPERATURE: 97 F | SYSTOLIC BLOOD PRESSURE: 126 MMHG | BODY MASS INDEX: 35.79 KG/M2 | OXYGEN SATURATION: 98 % | WEIGHT: 250 LBS

## 2022-08-09 LAB — GLUCOSE BLDC GLUCOMTR-MCNC: 204

## 2022-08-09 PROCEDURE — 82962 GLUCOSE BLOOD TEST: CPT

## 2022-08-09 PROCEDURE — 99214 OFFICE O/P EST MOD 30 MIN: CPT | Mod: 25

## 2022-08-09 PROCEDURE — 95251 CONT GLUC MNTR ANALYSIS I&R: CPT

## 2022-08-09 NOTE — ASSESSMENT
[Diabetes Foot Care] : diabetes foot care [Long Term Vascular Complications] : long term vascular complications of diabetes [Carbohydrate Consistent Diet] : carbohydrate consistent diet [Importance of Diet and Exercise] : importance of diet and exercise to improve glycemic control, achieve weight loss and improve cardiovascular health [Exercise/Effect on Glucose] : exercise/effect on glucose [Hypoglycemia Management] : hypoglycemia management [Glucagon Use] : glucagon use [Action and use of Insulin] : action and use of short and long-acting insulin [Self Monitoring of Blood Glucose] : self monitoring of blood glucose [Insulin Self-Administration] : insulin self-administration [Injection Technique, Storage, Sharps Disposal] : injection technique, storage, and sharps disposal [Sick-Day Management] : sick-day management [Retinopathy Screening] : Patient was referred to ophthalmology for retinopathy screening [Smoking Cessation] : smoking cessation [Weight Loss] : weight loss [Diabetic Medications] : Risks and benefits of diabetic medications were discussed [FreeTextEntry1] : - will wait with GLP1 until TG improve\par - Humulin R U-500 100-0-100\par - cont metformin 1000 mg bid,  Jardiance 10 mg qd\par - resume actos 30 mg. if able will start GLP1RA. if not- will resume Januvia\par - monitor BP, lipids, urine microalbumin\par - fasting labs next week\par - cont lipitor, lopid, fish oil\par - smoking cessation\par - again advised on elective cardiology evaluation; he missed his last appt\par RTC 3 mos, CDE in between\par

## 2022-08-09 NOTE — HISTORY OF PRESENT ILLNESS
[FreeTextEntry1] : F/u for diabetes management\par \par *** Aug 09, 2022 ***\par \par had covid 2 weeks ago. feels ok now, recovered well. was off crestor while on Paxlovid\par on Humulin R U-500 85-0-85,  metformin 1000 mg bid, Jardiance 10 mg qd, actos 30 mg, Januvia 100 mg qd, crestor, fenofibrate,  fish oil\par stopped actos and januvia for no apparent reason\par \par inconsistent with pa\par Date of download:  8/9/22\par Diabetes Medications and Dosage: as above\par Indication for CGMS: verify a change in the treatment regimen, identify periods of hypoglycemia/ hyperglycemia. \par Modal day report: pattern. \par Pt with HYPO  0% of the time (NONE below 54),  21% in target range\par Hyperglycemia:  79% elevation \par Identified issues: carbohydrate counting\par dates analyzed:8/6/22-8/9/22\par \par \par \par *** Apr 21, 2022 ***\par \par taking Humulin R U-500 70-0-70 only,  metformin 1000 mg bid, actos 30 mg, Januvia 100 mg qd,  Jardiance 10 mg qd, lipitor, lopid, fish oil\par Date of download:  4/21/22\par Diabetes Medications and Dosage: as above\par Indication for CGMS: verify a change in the treatment regimen, identify periods of hypoglycemia/ hyperglycemia. \par Modal day report: pattern. \par Pt with HYPO  0% of the time (NONE below 54),  29% in target range\par Hyperglycemia:  71% elevation \par Identified issues: carbohydrate counting\par dates analyzed: 3/31/22-4/13/22\par \par \par \par *** Nov 18, 2021 ***\par \par taking Basaglar 58 un HS,  Admelog 40 un tid ac,  metformin 1000 mg bid, actos 30 mg, Januvia 100 mg qd\par off fish oil\par a1c- 8.7, TG- 749\par Date of download: 11/18/21 \par Diabetes Medications and Dosage: as above\par Indication for CGMS: verify a change in the treatment regimen, identify periods of hypoglycemia/ hyperglycemia. \par Modal day report: pattern. \par Pt with HYPO  0% of the time (NONE below 54),  27% in target range\par Hyperglycemia: 73 % elevation \par Identified issues: carbohydrate counting\par dates analyzed:11/5/21-11/18/21\par \par *** Jun 08, 2021 ***\par \par on Basaglar 55 un HS,  Admelog 25 un tid ac, metformin 1000 mg bid, actos 30 mg, januvia 100 mg\par taking fish oil, but stopped lopid\par feels better, but is concerned with a weight gain. no leg swelling\par \par wearing Pa\par Date of download:  6/8/21\par Diabetes Medications and Dosage: as above\par Indication for CGMS: verify a change in the treatment regimen, identify periods of hypoglycemia/ hyperglycemia. \par Modal day report: pattern. \par Pt with HYPO  0% of the time (NONE below 54), 44 % in target range\par Hyperglycemia:  56% elevation \par Identified issues: carbohydrate counting, post prandial spikes\par dates analyzed:6/2/21-6/8/21\par \par \par \par HISTORY OF PRESENT ILLNESS. \par \par Mr. JARAMILLO was diagnosed with Diabetes Mellitus Type 2 more than 10 years ago. He reports history HTN, dyslipidemia, neuropathy  (on lyrica) and denies CAD,  known complications of retinopathy, nephropathy. He is presently on Glipizide 10 mg qd, Nesina 25 mg qd,  Januvia 100 mg qd (? if on both Nesina and Januvia), metformin 1000 mg bid.\par Blood sugars are not checked at home.\par Hypoglycemia frequency: none\par Fingerstick glucose in the office today is 316 mg/dL (fasting)\par Diet: not following ADA\par Exercise: none\par \par Lab review: a1c- 11.3\par \par Last dilated eye - 2020\par Last podiatry visit  - 11/21\par Last cardiology evaluation - none\par Last stress test - none\par Last 2-D Echo -none\par Last nephrology evaluation - none\par Last neurology evaluation - 2020

## 2022-08-13 NOTE — ED PROVIDER NOTE - OBJECTIVE STATEMENT
Pertinent PMH/PSH/FHx/SHx and Review of Systems contained within:    52 y/o M w hx of DM, presents w R foot pain; reports a heavy object fell on his R foot while at work 2 days ago, went to see his PMD yesterday, and referred to ED due to metatarsal fracture; patient able to ambulate; denies any other injuries or complaints on ROS    PMH: DM  meds: metformin; gemfibrozil; januvia; lyrica; enalapril  allergies: nkda  SH: denies cig or drug use 86

## 2022-09-20 ENCOUNTER — RX RENEWAL (OUTPATIENT)
Age: 57
End: 2022-09-20

## 2022-09-20 RX ORDER — ERGOCALCIFEROL 1.25 MG/1
1.25 MG CAPSULE ORAL
Qty: 20 | Refills: 1 | Status: ACTIVE | COMMUNITY
Start: 2022-04-27 | End: 1900-01-01

## 2022-10-18 ENCOUNTER — RX RENEWAL (OUTPATIENT)
Age: 57
End: 2022-10-18

## 2022-10-29 ENCOUNTER — RX RENEWAL (OUTPATIENT)
Age: 57
End: 2022-10-29

## 2022-10-31 ENCOUNTER — RX RENEWAL (OUTPATIENT)
Age: 57
End: 2022-10-31

## 2022-11-09 ENCOUNTER — APPOINTMENT (OUTPATIENT)
Dept: ENDOCRINOLOGY | Facility: CLINIC | Age: 57
End: 2022-11-09

## 2022-11-09 ENCOUNTER — RX RENEWAL (OUTPATIENT)
Age: 57
End: 2022-11-09

## 2022-12-09 ENCOUNTER — RX RENEWAL (OUTPATIENT)
Age: 57
End: 2022-12-09

## 2022-12-16 RX ORDER — FLASH GLUCOSE SENSOR
KIT MISCELLANEOUS
Qty: 6 | Refills: 2 | Status: ACTIVE | COMMUNITY
Start: 2022-08-03 | End: 1900-01-01

## 2023-01-19 ENCOUNTER — LABORATORY RESULT (OUTPATIENT)
Age: 58
End: 2023-01-19

## 2023-01-20 LAB
25(OH)D3 SERPL-MCNC: 32.8 NG/ML
ALBUMIN SERPL ELPH-MCNC: 4.3 G/DL
ALP BLD-CCNC: 65 U/L
ALT SERPL-CCNC: 25 U/L
ANION GAP SERPL CALC-SCNC: 13 MMOL/L
AST SERPL-CCNC: 18 U/L
BILIRUB SERPL-MCNC: 0.3 MG/DL
BUN SERPL-MCNC: 21 MG/DL
CALCIUM SERPL-MCNC: 9.5 MG/DL
CHLORIDE SERPL-SCNC: 103 MMOL/L
CHOLEST SERPL-MCNC: 116 MG/DL
CO2 SERPL-SCNC: 24 MMOL/L
CREAT SERPL-MCNC: 0.88 MG/DL
EGFR: 102 ML/MIN/1.73M2
ESTIMATED AVERAGE GLUCOSE: 203 MG/DL
GLUCOSE SERPL-MCNC: 135 MG/DL
HBA1C MFR BLD HPLC: 8.7 %
HDLC SERPL-MCNC: 25 MG/DL
LDLC SERPL CALC-MCNC: NORMAL MG/DL
NONHDLC SERPL-MCNC: 91 MG/DL
POTASSIUM SERPL-SCNC: 4.8 MMOL/L
PROT SERPL-MCNC: 6.8 G/DL
SODIUM SERPL-SCNC: 140 MMOL/L
TRIGL SERPL-MCNC: 427 MG/DL
TSH SERPL-ACNC: 1.6 UIU/ML

## 2023-02-01 ENCOUNTER — APPOINTMENT (OUTPATIENT)
Dept: ENDOCRINOLOGY | Facility: CLINIC | Age: 58
End: 2023-02-01
Payer: MEDICAID

## 2023-02-01 VITALS — WEIGHT: 260 LBS | BODY MASS INDEX: 37.22 KG/M2 | HEIGHT: 70 IN

## 2023-02-01 PROCEDURE — G0108 DIAB MANAGE TRN  PER INDIV: CPT

## 2023-02-02 RX ORDER — BLOOD-GLUCOSE METER
KIT MISCELLANEOUS
Qty: 1 | Refills: 5 | Status: DISCONTINUED | COMMUNITY
Start: 2019-12-01 | End: 2023-02-02

## 2023-02-02 RX ORDER — BLOOD-GLUCOSE METER
W/DEVICE KIT MISCELLANEOUS
Qty: 1 | Refills: 0 | Status: DISCONTINUED | COMMUNITY
Start: 2019-12-01 | End: 2023-02-02

## 2023-02-02 RX ORDER — LANCETS 33 GAUGE
EACH MISCELLANEOUS
Qty: 100 | Refills: 5 | Status: DISCONTINUED | COMMUNITY
Start: 2021-04-26 | End: 2023-02-02

## 2023-02-02 RX ORDER — LANCETS 30 GAUGE
EACH MISCELLANEOUS
Qty: 100 | Refills: 5 | Status: DISCONTINUED | COMMUNITY
Start: 2020-06-22 | End: 2023-02-02

## 2023-02-02 RX ORDER — METFORMIN HYDROCHLORIDE 1000 MG/1
1000 TABLET, COATED ORAL
Qty: 60 | Refills: 2 | Status: ACTIVE | COMMUNITY
Start: 2019-07-17 | End: 1900-01-01

## 2023-02-02 RX ORDER — PEN NEEDLE, DIABETIC 31 GX5/16"
NEEDLE, DISPOSABLE MISCELLANEOUS
Qty: 1 | Refills: 5 | Status: DISCONTINUED | COMMUNITY
Start: 2019-12-01 | End: 2023-02-02

## 2023-02-02 RX ORDER — NIRMATRELVIR AND RITONAVIR 300-100 MG
20 X 150 MG & KIT ORAL
Qty: 10 | Refills: 0 | Status: DISCONTINUED | COMMUNITY
Start: 2022-08-01 | End: 2023-02-02

## 2023-02-02 RX ORDER — BLOOD SUGAR DIAGNOSTIC
STRIP MISCELLANEOUS
Qty: 1 | Refills: 5 | Status: DISCONTINUED | COMMUNITY
Start: 2020-06-22 | End: 2023-02-02

## 2023-02-08 ENCOUNTER — APPOINTMENT (OUTPATIENT)
Dept: ENDOCRINOLOGY | Facility: CLINIC | Age: 58
End: 2023-02-08
Payer: MEDICAID

## 2023-02-08 VITALS
OXYGEN SATURATION: 94 % | RESPIRATION RATE: 16 BRPM | SYSTOLIC BLOOD PRESSURE: 122 MMHG | HEART RATE: 90 BPM | TEMPERATURE: 97.3 F | WEIGHT: 252 LBS | DIASTOLIC BLOOD PRESSURE: 78 MMHG | HEIGHT: 70 IN | BODY MASS INDEX: 36.08 KG/M2

## 2023-02-08 LAB — GLUCOSE BLDC GLUCOMTR-MCNC: 121

## 2023-02-08 PROCEDURE — 82962 GLUCOSE BLOOD TEST: CPT

## 2023-02-08 PROCEDURE — 99406 BEHAV CHNG SMOKING 3-10 MIN: CPT

## 2023-02-08 PROCEDURE — 99214 OFFICE O/P EST MOD 30 MIN: CPT | Mod: 25

## 2023-02-08 RX ORDER — CIDER VINEGAR 300 MG
1000 TABLET ORAL DAILY
Qty: 90 | Refills: 2 | Status: DISCONTINUED | COMMUNITY
Start: 2019-12-02 | End: 2023-02-08

## 2023-02-08 RX ORDER — PIOGLITAZONE HYDROCHLORIDE 30 MG/1
30 TABLET ORAL
Qty: 90 | Refills: 3 | Status: ACTIVE | COMMUNITY
Start: 2021-04-19 | End: 1900-01-01

## 2023-02-08 RX ORDER — OMEGA-3-ACID ETHYL ESTERS CAPSULES 1 G/1
1 CAPSULE, LIQUID FILLED ORAL
Qty: 360 | Refills: 2 | Status: ACTIVE | COMMUNITY
Start: 2023-02-08 | End: 1900-01-01

## 2023-02-08 RX ORDER — SITAGLIPTIN 100 MG/1
100 TABLET, FILM COATED ORAL
Qty: 30 | Refills: 6 | Status: DISCONTINUED | COMMUNITY
Start: 2021-05-19 | End: 2023-02-08

## 2023-02-08 RX ORDER — FENOFIBRATE 160 MG/1
160 TABLET ORAL DAILY
Qty: 90 | Refills: 3 | Status: ACTIVE | COMMUNITY
Start: 2022-04-27 | End: 1900-01-01

## 2023-02-08 RX ORDER — ROSUVASTATIN CALCIUM 40 MG/1
40 TABLET, FILM COATED ORAL
Qty: 90 | Refills: 2 | Status: ACTIVE | COMMUNITY
Start: 2022-04-27 | End: 1900-01-01

## 2023-02-08 NOTE — ASSESSMENT
[Diabetes Foot Care] : diabetes foot care [Long Term Vascular Complications] : long term vascular complications of diabetes [Carbohydrate Consistent Diet] : carbohydrate consistent diet [Importance of Diet and Exercise] : importance of diet and exercise to improve glycemic control, achieve weight loss and improve cardiovascular health [Exercise/Effect on Glucose] : exercise/effect on glucose [Hypoglycemia Management] : hypoglycemia management [Glucagon Use] : glucagon use [Action and use of Insulin] : action and use of short and long-acting insulin [Self Monitoring of Blood Glucose] : self monitoring of blood glucose [Insulin Self-Administration] : insulin self-administration [Injection Technique, Storage, Sharps Disposal] : injection technique, storage, and sharps disposal [Sick-Day Management] : sick-day management [Retinopathy Screening] : Patient was referred to ophthalmology for retinopathy screening [Smoking Cessation] : smoking cessation [Weight Loss] : weight loss [Diabetic Medications] : Risks and benefits of diabetic medications were discussed [FreeTextEntry1] : T2DM, uncontrolled\par - Humulin R U-500 110-0-110\par - cont metformin 1000 mg bid,  Jardiance 10 mg qd\par - actos 30 mg.\par - since TG improved, will start Ozempic 0.25 mg qw and uptitrate as scheduled. R+B reviewed in details\par - monitor BP, lipids, urine microalbumin\par - fasting labs next week\par - cont crestor, fenofibrate, add Lovaza and stop OTC fish oil\par - smoking cessation\par - again advised on elective cardiology evaluation; he missed his last appt\par RTC 3 mos, CDE in between\par

## 2023-02-08 NOTE — HISTORY OF PRESENT ILLNESS
[FreeTextEntry1] : F/u for diabetes management\par \par *** Feb 08, 2023 ***\par \par feels well, no new c/o. feels better overall\par staying on Humulin R U-500 100-0-100,  metformin 1000 mg bid,  Jardiance 10 mg qd,  actos 30 mg, crestor, fenofibrate 160 mg,  fish oil\par off januvia\par a1c- 8.7, TG- 427\par wearing dexcom\par Date of download:  02/08/2023 \par Diabetes Medications and Dosage: as above\par Indication for CGMS: verify a change in the treatment regimen, identify periods of hypoglycemia/ hyperglycemia. \par Modal day report: pattern. \par Pt with HYPO  0% of the time ( NONE below 54),  31% in target range\par Hyperglycemia:  69% elevation \par Identified issues: carbohydrate counting\par dates analyzed:01/26/2023  - 02/08/2023\par \par \par *** Aug 09, 2022 ***\par \par had covid 2 weeks ago. feels ok now, recovered well. was off crestor while on Paxlovid\par on Humulin R U-500 85-0-85,  metformin 1000 mg bid, Jardiance 10 mg qd, actos 30 mg, Januvia 100 mg qd, crestor, fenofibrate,  fish oil\par stopped actos and januvia for no apparent reason\par \par inconsistent with pa\par Date of download:  8/9/22\par Diabetes Medications and Dosage: as above\par Indication for CGMS: verify a change in the treatment regimen, identify periods of hypoglycemia/ hyperglycemia. \par Modal day report: pattern. \par Pt with HYPO  0% of the time (NONE below 54),  21% in target range\par Hyperglycemia:  79% elevation \par Identified issues: carbohydrate counting\par dates analyzed:8/6/22-8/9/22\par \par \par \par *** Apr 21, 2022 ***\par \par taking Humulin R U-500 70-0-70 only,  metformin 1000 mg bid, actos 30 mg, Januvia 100 mg qd,  Jardiance 10 mg qd, lipitor, lopid, fish oil\par Date of download:  4/21/22\par Diabetes Medications and Dosage: as above\par Indication for CGMS: verify a change in the treatment regimen, identify periods of hypoglycemia/ hyperglycemia. \par Modal day report: pattern. \par Pt with HYPO  0% of the time (NONE below 54),  29% in target range\par Hyperglycemia:  71% elevation \par Identified issues: carbohydrate counting\par dates analyzed: 3/31/22-4/13/22\par \par \par \par *** Nov 18, 2021 ***\par \par taking Basaglar 58 un HS,  Admelog 40 un tid ac,  metformin 1000 mg bid, actos 30 mg, Januvia 100 mg qd\par off fish oil\par a1c- 8.7, TG- 749\par Date of download: 11/18/21 \par Diabetes Medications and Dosage: as above\par Indication for CGMS: verify a change in the treatment regimen, identify periods of hypoglycemia/ hyperglycemia. \par Modal day report: pattern. \par Pt with HYPO  0% of the time (NONE below 54),  27% in target range\par Hyperglycemia: 73 % elevation \par Identified issues: carbohydrate counting\par dates analyzed:11/5/21-11/18/21\par \par *** Jun 08, 2021 ***\par \par on Basaglar 55 un HS,  Admelog 25 un tid ac, metformin 1000 mg bid, actos 30 mg, januvia 100 mg\par taking fish oil, but stopped lopid\par feels better, but is concerned with a weight gain. no leg swelling\par \par wearing Pa\par Date of download:  6/8/21\par Diabetes Medications and Dosage: as above\par Indication for CGMS: verify a change in the treatment regimen, identify periods of hypoglycemia/ hyperglycemia. \par Modal day report: pattern. \par Pt with HYPO  0% of the time (NONE below 54), 44 % in target range\par Hyperglycemia:  56% elevation \par Identified issues: carbohydrate counting, post prandial spikes\par dates analyzed:6/2/21-6/8/21\par \par \par \par HISTORY OF PRESENT ILLNESS. \par \par Mr. JARAMILLO was diagnosed with Diabetes Mellitus Type 2 more than 10 years ago. He reports history HTN, dyslipidemia, neuropathy  (on lyrica) and denies CAD,  known complications of retinopathy, nephropathy. He is presently on Glipizide 10 mg qd, Nesina 25 mg qd,  Januvia 100 mg qd (? if on both Nesina and Januvia), metformin 1000 mg bid.\par Blood sugars are not checked at home.\par Hypoglycemia frequency: none\par Fingerstick glucose in the office today is 316 mg/dL (fasting)\par Diet: not following ADA\par Exercise: none\par \par Lab review: a1c- 11.3\par \par Last dilated eye - 2020\par Last podiatry visit  - 11/21\par Last cardiology evaluation - none\par Last stress test - none\par Last 2-D Echo -none\par Last nephrology evaluation - none\par Last neurology evaluation - 2020

## 2023-02-17 ENCOUNTER — APPOINTMENT (OUTPATIENT)
Dept: INTERNAL MEDICINE | Facility: CLINIC | Age: 58
End: 2023-02-17
Payer: MEDICAID

## 2023-02-17 VITALS
BODY MASS INDEX: 36.22 KG/M2 | OXYGEN SATURATION: 96 % | HEART RATE: 90 BPM | HEIGHT: 70 IN | RESPIRATION RATE: 16 BRPM | SYSTOLIC BLOOD PRESSURE: 142 MMHG | WEIGHT: 253 LBS | DIASTOLIC BLOOD PRESSURE: 80 MMHG

## 2023-02-17 VITALS — SYSTOLIC BLOOD PRESSURE: 120 MMHG | DIASTOLIC BLOOD PRESSURE: 70 MMHG

## 2023-02-17 DIAGNOSIS — E11.21 TYPE 2 DIABETES MELLITUS WITH DIABETIC NEPHROPATHY: ICD-10-CM

## 2023-02-17 DIAGNOSIS — G89.29 PAIN IN LEG, UNSPECIFIED: ICD-10-CM

## 2023-02-17 DIAGNOSIS — M54.30 SCIATICA, UNSPECIFIED SIDE: ICD-10-CM

## 2023-02-17 DIAGNOSIS — N52.9 MALE ERECTILE DYSFUNCTION, UNSPECIFIED: ICD-10-CM

## 2023-02-17 DIAGNOSIS — M79.606 PAIN IN LEG, UNSPECIFIED: ICD-10-CM

## 2023-02-17 DIAGNOSIS — K21.9 GASTRO-ESOPHAGEAL REFLUX DISEASE W/OUT ESOPHAGITIS: ICD-10-CM

## 2023-02-17 DIAGNOSIS — F17.210 NICOTINE DEPENDENCE, CIGARETTES, UNCOMPLICATED: ICD-10-CM

## 2023-02-17 DIAGNOSIS — L30.4 ERYTHEMA INTERTRIGO: ICD-10-CM

## 2023-02-17 PROCEDURE — 99214 OFFICE O/P EST MOD 30 MIN: CPT | Mod: 25

## 2023-02-17 PROCEDURE — 99406 BEHAV CHNG SMOKING 3-10 MIN: CPT

## 2023-02-17 RX ORDER — CLOTRIMAZOLE AND BETAMETHASONE DIPROPIONATE 10; .5 MG/G; MG/G
1-0.05 CREAM TOPICAL TWICE DAILY
Qty: 1 | Refills: 1 | Status: ACTIVE | COMMUNITY
Start: 2018-02-22 | End: 1900-01-01

## 2023-02-17 NOTE — HISTORY OF PRESENT ILLNESS
[FreeTextEntry1] : DM, HLD, neuropathy, ED, leg and back pain [de-identified] : Pt is a 58 y/o male with a hx of DM, hld, neuropathy, ED, leg and back pain. \par He has been on medications for the DM, lipid meds On lyrica for the neuropathy. \par Has followed up with Dr Villalpando and with diabetic educator.  notes since last time reviewed and d/w pt.  discussed labs sent by Dr Villalpando last month - A1c 8.7, Trig 427, hdl 25, Cr and lft nl.  Vit D and TSH nl.\par Lovaza and ozempic were added.\par Taking meds. \par diet improved.\par Has been riding bicycle.\par some wt inc\par FS - has dexcom - reported as improved\par due to f/u with ophtho. \par \par \par Sleeping has been okay - variable. \par Still with neuropathic sx. follows with Neurology - Dr Manish Holguin, also follows with pod- Dr Alicia - Reports that the neuropathic sx include pain at the leg and numbness at the toes. + burning at the feet and the back with some pinching. Pain at the toes has increased. Had no relief with gabapentin - controlled with the lyrica Some pinching/numbness sensation at the hands - s/p EMG. in the past had been given oxy by other MD. Reports that he has continued to follow. Has appt.  \par + still with pain at the back. s/p injection with relief. Has been increasing. \par + polyuria, with night time polydipsia - with nocturia x3. no polyphagia. \par no noted CV sx. \par no GI sx. Has been having occ GERD sx. \par no noted dizziness. no confusion.\par Flank and abd have been fine\par Reports some feeling of le edema in the afternoon. follows with cardiology. \par with ? new blister at the dorsal left foot. no pain or pruritus. \par \par Has been smoking a little. Reducing slowly - tapering down to off. \par \par Tdap - '17\par pneumo ??\par get flu vaccine\par \par ophtho - due - Dr Ocasio - has appt in may\par colon - fit 5/21 - due\par prost- 12/19

## 2023-02-17 NOTE — REVIEW OF SYSTEMS
[Fever] : fever [Chills] : chills [Fatigue] : fatigue [Night Sweats] : night sweats [Nasal Discharge] : nasal discharge [Sore Throat] : sore throat [Shortness Of Breath] : shortness of breath [Cough] : cough [Nocturia] : nocturia [Impotence] : impotence [Headache] : headache [Negative] : Heme/Lymph [Hot Flashes] : no hot flashes [Recent Change In Weight] : ~T no recent weight change [Earache] : no earache [Hearing Loss] : no hearing loss [Nosebleeds] : no nosebleeds [Postnasal Drip] : no postnasal drip [Hoarseness] : no hoarseness [Wheezing] : no wheezing [Dyspnea on Exertion] : not dyspnea on exertion [Abdominal Pain] : no abdominal pain [Nausea] : no nausea [Constipation] : no constipation [Diarrhea] : no diarrhea [Vomiting] : no vomiting [Heartburn] : no heartburn [Melena] : no melena [Dysuria] : no dysuria [Incontinence] : no incontinence [Hematuria] : no hematuria [Frequency] : no frequency [Itching] : no itching [Skin Rash] : no skin rash [Suicidal] : not suicidal [Insomnia] : no insomnia [Anxiety] : no anxiety [Depression] : no depression [FreeTextEntry9] : back and leg pain, hand pain, neck and shoulder pains [de-identified] : numbness as noted - neuropathy

## 2023-02-17 NOTE — PHYSICAL EXAM
[No Acute Distress] : no acute distress [Well Nourished] : well nourished [Well Developed] : well developed [Well-Appearing] : well-appearing [Normal Voice/Communication] : normal voice/communication [Normal Sclera/Conjunctiva] : normal sclera/conjunctiva [Normal Outer Ear/Nose] : the outer ears and nose were normal in appearance [No Respiratory Distress] : no respiratory distress  [No Accessory Muscle Use] : no accessory muscle use [Coordination Grossly Intact] : coordination grossly intact [Speech Grossly Normal] : speech grossly normal [Memory Grossly Normal] : memory grossly normal [Normal Affect] : the affect was normal [Alert and Oriented x3] : oriented to person, place, and time [Normal Mood] : the mood was normal [Normal Insight/Judgement] : insight and judgment were intact [PERRL] : pupils equal round and reactive to light [EOMI] : extraocular movements intact [No JVD] : no jugular venous distention [No Lymphadenopathy] : no lymphadenopathy [Supple] : supple [Thyroid Normal, No Nodules] : the thyroid was normal and there were no nodules present [Clear to Auscultation] : lungs were clear to auscultation bilaterally [Normal Rate] : normal rate  [Regular Rhythm] : with a regular rhythm [Normal S1, S2] : normal S1 and S2 [No Murmur] : no murmur heard [No Carotid Bruits] : no carotid bruits [No Abdominal Bruit] : a ~M bruit was not heard ~T in the abdomen [Pedal Pulses Present] : the pedal pulses are present [No Palpable Aorta] : no palpable aorta [Soft] : abdomen soft [Non Tender] : non-tender [Non-distended] : non-distended [No Masses] : no abdominal mass palpated [No HSM] : no HSM [Normal Bowel Sounds] : normal bowel sounds [Normal Posterior Cervical Nodes] : no posterior cervical lymphadenopathy [Normal Anterior Cervical Nodes] : no anterior cervical lymphadenopathy [No CVA Tenderness] : no CVA  tenderness [No Spinal Tenderness] : no spinal tenderness [No Joint Swelling] : no joint swelling [Grossly Normal Strength/Tone] : grossly normal strength/tone [No Focal Deficits] : no focal deficits [Normal Gait] : normal gait [de-identified] : obese [de-identified] : min tr edema [de-identified] : with pod

## 2023-02-17 NOTE — HEALTH RISK ASSESSMENT
[Yes] : Yes [Monthly or less (1 pt)] : Monthly or less (1 point) [1 or 2 (0 pts)] : 1 or 2 (0 points) [Never (0 pts)] : Never (0 points) [No] : In the past 12 months have you used drugs other than those required for medical reasons? No [0] : 2) Feeling down, depressed, or hopeless: Not at all (0) [PHQ-2 Negative - No further assessment needed] : PHQ-2 Negative - No further assessment needed [Current] : Current [Audit-CScore] : 1 [OUV7Snohx] : 0

## 2023-02-21 DIAGNOSIS — Z23 ENCOUNTER FOR IMMUNIZATION: ICD-10-CM

## 2023-02-21 RX ORDER — ZOSTER VACCINE RECOMBINANT, ADJUVANTED 50 MCG/0.5
50 KIT INTRAMUSCULAR
Qty: 1 | Refills: 1 | Status: ACTIVE | OUTPATIENT
Start: 2023-02-21

## 2023-03-14 ENCOUNTER — RX RENEWAL (OUTPATIENT)
Age: 58
End: 2023-03-14

## 2023-03-14 RX ORDER — ERGOCALCIFEROL 1.25 MG/1
1.25 MG CAPSULE ORAL
Qty: 13 | Refills: 1 | Status: ACTIVE | COMMUNITY
Start: 2023-03-14 | End: 1900-01-01

## 2023-04-04 ENCOUNTER — APPOINTMENT (OUTPATIENT)
Dept: ENDOCRINOLOGY | Facility: CLINIC | Age: 58
End: 2023-04-04

## 2023-04-19 RX ORDER — BLOOD-GLUCOSE TRANSMITTER
EACH MISCELLANEOUS
Qty: 1 | Refills: 4 | Status: ACTIVE | COMMUNITY
Start: 2023-04-19 | End: 1900-01-01

## 2023-04-24 RX ORDER — BLOOD-GLUCOSE SENSOR
EACH MISCELLANEOUS
Qty: 2 | Refills: 4 | Status: ACTIVE | COMMUNITY
Start: 2023-04-19 | End: 1900-01-01

## 2023-04-25 ENCOUNTER — APPOINTMENT (OUTPATIENT)
Dept: ENDOCRINOLOGY | Facility: CLINIC | Age: 58
End: 2023-04-25
Payer: MEDICAID

## 2023-04-25 VITALS — BODY MASS INDEX: 36.36 KG/M2 | WEIGHT: 254 LBS | HEIGHT: 70 IN

## 2023-04-25 PROCEDURE — G0108 DIAB MANAGE TRN  PER INDIV: CPT

## 2023-04-25 RX ORDER — BLOOD-GLUCOSE TRANSMITTER
EACH MISCELLANEOUS
Qty: 1 | Refills: 4 | Status: ACTIVE | COMMUNITY
Start: 2023-04-25 | End: 1900-01-01

## 2023-04-25 RX ORDER — SEMAGLUTIDE 1.34 MG/ML
4 INJECTION, SOLUTION SUBCUTANEOUS
Qty: 1 | Refills: 3 | Status: ACTIVE | COMMUNITY
Start: 2023-04-25 | End: 1900-01-01

## 2023-04-28 RX ORDER — INSULIN HUMAN 500 [IU]/ML
500 INJECTION, SOLUTION SUBCUTANEOUS
Qty: 10 | Refills: 6 | Status: ACTIVE | COMMUNITY
Start: 2021-11-18 | End: 1900-01-01

## 2023-05-03 RX ORDER — BLOOD-GLUCOSE TRANSMITTER
EACH MISCELLANEOUS
Qty: 1 | Refills: 3 | Status: ACTIVE | COMMUNITY
Start: 2023-04-26 | End: 1900-01-01

## 2023-05-03 RX ORDER — BLOOD-GLUCOSE SENSOR
EACH MISCELLANEOUS
Qty: 1 | Refills: 11 | Status: ACTIVE | COMMUNITY
Start: 2023-04-25 | End: 1900-01-01

## 2023-05-10 ENCOUNTER — NON-APPOINTMENT (OUTPATIENT)
Age: 58
End: 2023-05-10

## 2023-05-10 ENCOUNTER — APPOINTMENT (OUTPATIENT)
Dept: CARDIOLOGY | Facility: CLINIC | Age: 58
End: 2023-05-10
Payer: MEDICAID

## 2023-05-10 ENCOUNTER — APPOINTMENT (OUTPATIENT)
Dept: ENDOCRINOLOGY | Facility: CLINIC | Age: 58
End: 2023-05-10
Payer: MEDICAID

## 2023-05-10 VITALS
BODY MASS INDEX: 36.1 KG/M2 | SYSTOLIC BLOOD PRESSURE: 148 MMHG | TEMPERATURE: 98.5 F | OXYGEN SATURATION: 99 % | WEIGHT: 252.19 LBS | DIASTOLIC BLOOD PRESSURE: 75 MMHG | HEART RATE: 97 BPM | HEIGHT: 70 IN

## 2023-05-10 VITALS
HEART RATE: 94 BPM | OXYGEN SATURATION: 96 % | DIASTOLIC BLOOD PRESSURE: 80 MMHG | HEIGHT: 70 IN | SYSTOLIC BLOOD PRESSURE: 139 MMHG | BODY MASS INDEX: 35.93 KG/M2 | WEIGHT: 251 LBS

## 2023-05-10 VITALS — DIASTOLIC BLOOD PRESSURE: 72 MMHG | SYSTOLIC BLOOD PRESSURE: 110 MMHG

## 2023-05-10 DIAGNOSIS — R07.89 OTHER CHEST PAIN: ICD-10-CM

## 2023-05-10 DIAGNOSIS — E66.9 OBESITY, UNSPECIFIED: ICD-10-CM

## 2023-05-10 DIAGNOSIS — E11.65 TYPE 2 DIABETES MELLITUS WITH HYPERGLYCEMIA: ICD-10-CM

## 2023-05-10 DIAGNOSIS — E78.5 HYPERLIPIDEMIA, UNSPECIFIED: ICD-10-CM

## 2023-05-10 LAB — GLUCOSE BLDC GLUCOMTR-MCNC: 127

## 2023-05-10 PROCEDURE — 99204 OFFICE O/P NEW MOD 45 MIN: CPT | Mod: 25

## 2023-05-10 PROCEDURE — 93000 ELECTROCARDIOGRAM COMPLETE: CPT

## 2023-05-10 PROCEDURE — 99406 BEHAV CHNG SMOKING 3-10 MIN: CPT

## 2023-05-10 PROCEDURE — 99214 OFFICE O/P EST MOD 30 MIN: CPT | Mod: 25

## 2023-05-10 PROCEDURE — 82962 GLUCOSE BLOOD TEST: CPT

## 2023-05-10 RX ORDER — SEMAGLUTIDE 1.34 MG/ML
2 INJECTION, SOLUTION SUBCUTANEOUS
Qty: 3 | Refills: 6 | Status: DISCONTINUED | COMMUNITY
Start: 2023-02-08 | End: 2023-05-10

## 2023-05-10 RX ORDER — EMPAGLIFLOZIN 25 MG/1
25 TABLET, FILM COATED ORAL DAILY
Qty: 1 | Refills: 6 | Status: ACTIVE | COMMUNITY
Start: 2023-04-25 | End: 1900-01-01

## 2023-05-10 RX ORDER — EMPAGLIFLOZIN 10 MG/1
10 TABLET, FILM COATED ORAL
Qty: 90 | Refills: 2 | Status: DISCONTINUED | COMMUNITY
Start: 2021-11-18 | End: 2023-05-10

## 2023-05-10 NOTE — QUALITY MEASURES
Her/She
[Visual inspection, sensory exam] : Foot exam, including visual inspection, sensory exam with mono filament, and pulse exam, was performed within the last 12 months

## 2023-05-10 NOTE — ASSESSMENT
[Diabetes Foot Care] : diabetes foot care [Long Term Vascular Complications] : long term vascular complications of diabetes [Carbohydrate Consistent Diet] : carbohydrate consistent diet [Importance of Diet and Exercise] : importance of diet and exercise to improve glycemic control, achieve weight loss and improve cardiovascular health [Exercise/Effect on Glucose] : exercise/effect on glucose [Hypoglycemia Management] : hypoglycemia management [Glucagon Use] : glucagon use [Action and use of Insulin] : action and use of short and long-acting insulin [Self Monitoring of Blood Glucose] : self monitoring of blood glucose [Insulin Self-Administration] : insulin self-administration [Injection Technique, Storage, Sharps Disposal] : injection technique, storage, and sharps disposal [Sick-Day Management] : sick-day management [Retinopathy Screening] : Patient was referred to ophthalmology for retinopathy screening [Smoking Cessation] : smoking cessation [Weight Loss] : weight loss [Diabetic Medications] : Risks and benefits of diabetic medications were discussed [FreeTextEntry1] : T2DM, uncontrolled\par - Humulin R U-500 110-0-100\par - cont metformin 1000 mg bid,  increase Jardiance 25 mg qd\par - actos 30 mg.\par - Ozempic 1 mg qw\par - monitor BP, lipids, urine microalbumin\par - fasting labs next week\par - cont crestor, fenofibrate, add Lovaza and stop OTC fish oil\par - smoking cessation\par - again advised on elective cardiology evaluation; he missed his last appt\par RTC 3 mos, CDE in between\par

## 2023-05-10 NOTE — HISTORY OF PRESENT ILLNESS
[FreeTextEntry1] : 57M with HLD, DM who presents for evaluation of L arm numbness, chest pain\par \par Notes numbness in L arm while sleeping, while holding the steering wheel while driving\par Nonexertional\par Also notes a feeling in chest, usually at night, nonexertional, been present for a few months. Feels about once a week. \par Denies dyspnea\par Goes up and down stairs frequently without symptoms. He works in the elevator business \par Last a1c 8.7% (had been > 11)\par Last , direct LDL 33\par \par Current smoker. Works with elevators. Denies family hx of CAD\par \par ECG: SR, no ST-T wave changes\par \par Rosuvastatin 40mg\par Fenofibrate 160mg\par \par Lisinopril 10mg\par \par Metformin 1000mg BID\par Jardiance 25mg\par Pioglitazone 30mg daily \par Ozempic

## 2023-05-10 NOTE — DISCUSSION/SUMMARY
[FreeTextEntry1] : Poorly controlled DM\par HLD with hypertriglyceridemia\par Atypical CP\par Smoker\par \par Multiple risk factors for CAD\par Will arrange for TTE, EST to start\par Low threshold for further testing thereafter\par \par A1C 8.7%, seeing endocrine. Better than it has been but needs stricter control\par \par LDL great, TG high. Cont fibrate, statin. Strict glycemic control\par BP improving on recheck \par \par Smoking cessation strongly encouraged, pt is motivated to quit \par \par RV for cardiac testing  [EKG obtained to assist in diagnosis and management of assessed problem(s)] : EKG obtained to assist in diagnosis and management of assessed problem(s)

## 2023-05-10 NOTE — HISTORY OF PRESENT ILLNESS
[FreeTextEntry1] : F/u for diabetes management\par \par *** May 10, 2023 ***\par \par feels better, lost some weight\par taking Humulin R U-500 100-0-100, Ozempic 1 mg qw,  metformin 1000 mg bid,  Jardiance 10 mg qd,  actos 30 mg.\par wearing Dexcom\par Date of download:  05/10/2023 \par Diabetes Medications and Dosage: as above\par Indication for CGMS: verify a change in the treatment regimen, identify periods of hypoglycemia/ hyperglycemia. \par Modal day report: pattern. \par Pt with HYPO  0% of the time ( NONE below 54),  50% in target range\par Hyperglycemia:  50% elevation \par Identified issues: carbohydrate counting\par dates analyzed:  04/27/2023- 05/10/2023\par \par \par *** Feb 08, 2023 ***\par \par feels well, no new c/o. feels better overall\par staying on Humulin R U-500 100-0-100,  metformin 1000 mg bid,  Jardiance 10 mg qd,  actos 30 mg, crestor, fenofibrate 160 mg,  fish oil\par off januvia\par a1c- 8.7, TG- 427\par wearing dexcom\par Date of download:  02/08/2023 \par Diabetes Medications and Dosage: as above\par Indication for CGMS: verify a change in the treatment regimen, identify periods of hypoglycemia/ hyperglycemia. \par Modal day report: pattern. \par Pt with HYPO  0% of the time ( NONE below 54),  31% in target range\par Hyperglycemia:  69% elevation \par Identified issues: carbohydrate counting\par dates analyzed:01/26/2023  - 02/08/2023\par \par \par *** Aug 09, 2022 ***\par \par had covid 2 weeks ago. feels ok now, recovered well. was off crestor while on Paxlovid\par on Humulin R U-500 85-0-85,  metformin 1000 mg bid, Jardiance 10 mg qd, actos 30 mg, Januvia 100 mg qd, crestor, fenofibrate,  fish oil\par stopped actos and januvia for no apparent reason\par \par inconsistent with pa\par Date of download:  8/9/22\par Diabetes Medications and Dosage: as above\par Indication for CGMS: verify a change in the treatment regimen, identify periods of hypoglycemia/ hyperglycemia. \par Modal day report: pattern. \par Pt with HYPO  0% of the time (NONE below 54),  21% in target range\par Hyperglycemia:  79% elevation \par Identified issues: carbohydrate counting\par dates analyzed:8/6/22-8/9/22\par \par \par \par *** Apr 21, 2022 ***\par \par taking Humulin R U-500 70-0-70 only,  metformin 1000 mg bid, actos 30 mg, Januvia 100 mg qd,  Jardiance 10 mg qd, lipitor, lopid, fish oil\par Date of download:  4/21/22\par Diabetes Medications and Dosage: as above\par Indication for CGMS: verify a change in the treatment regimen, identify periods of hypoglycemia/ hyperglycemia. \par Modal day report: pattern. \par Pt with HYPO  0% of the time (NONE below 54),  29% in target range\par Hyperglycemia:  71% elevation \par Identified issues: carbohydrate counting\par dates analyzed: 3/31/22-4/13/22\par \par \par \par *** Nov 18, 2021 ***\par \par taking Basaglar 58 un HS,  Admelog 40 un tid ac,  metformin 1000 mg bid, actos 30 mg, Januvia 100 mg qd\par off fish oil\par a1c- 8.7, TG- 749\par Date of download: 11/18/21 \par Diabetes Medications and Dosage: as above\par Indication for CGMS: verify a change in the treatment regimen, identify periods of hypoglycemia/ hyperglycemia. \par Modal day report: pattern. \par Pt with HYPO  0% of the time (NONE below 54),  27% in target range\par Hyperglycemia: 73 % elevation \par Identified issues: carbohydrate counting\par dates analyzed:11/5/21-11/18/21\par \par *** Jun 08, 2021 ***\par \par on Basaglar 55 un HS,  Admelog 25 un tid ac, metformin 1000 mg bid, actos 30 mg, januvia 100 mg\par taking fish oil, but stopped lopid\par feels better, but is concerned with a weight gain. no leg swelling\par \par wearing Pa\par Date of download:  6/8/21\par Diabetes Medications and Dosage: as above\par Indication for CGMS: verify a change in the treatment regimen, identify periods of hypoglycemia/ hyperglycemia. \par Modal day report: pattern. \par Pt with HYPO  0% of the time (NONE below 54), 44 % in target range\par Hyperglycemia:  56% elevation \par Identified issues: carbohydrate counting, post prandial spikes\par dates analyzed:6/2/21-6/8/21\par \par \par \par HISTORY OF PRESENT ILLNESS. \par \par Mr. JARAMILLO was diagnosed with Diabetes Mellitus Type 2 more than 10 years ago. He reports history HTN, dyslipidemia, neuropathy  (on lyrica) and denies CAD,  known complications of retinopathy, nephropathy. He is presently on Glipizide 10 mg qd, Nesina 25 mg qd,  Januvia 100 mg qd (? if on both Nesina and Januvia), metformin 1000 mg bid.\par Blood sugars are not checked at home.\par Hypoglycemia frequency: none\par Fingerstick glucose in the office today is 316 mg/dL (fasting)\par Diet: not following ADA\par Exercise: none\par \par Lab review: a1c- 11.3\par \par Last dilated eye - 3/23\par Last podiatry visit  - 2022\par Last cardiology evaluation - none\par Last stress test - none\par Last 2-D Echo -none\par Last nephrology evaluation - none\par Last neurology evaluation - 2020

## 2023-05-11 LAB
ALBUMIN SERPL ELPH-MCNC: 4.7 G/DL
ALP BLD-CCNC: 71 U/L
ALT SERPL-CCNC: 33 U/L
ANION GAP SERPL CALC-SCNC: 13 MMOL/L
AST SERPL-CCNC: 24 U/L
BILIRUB SERPL-MCNC: 0.6 MG/DL
BUN SERPL-MCNC: 16 MG/DL
CALCIUM SERPL-MCNC: 9.6 MG/DL
CHLORIDE SERPL-SCNC: 102 MMOL/L
CHOLEST SERPL-MCNC: 151 MG/DL
CO2 SERPL-SCNC: 23 MMOL/L
CREAT SERPL-MCNC: 0.74 MG/DL
CREAT SPEC-SCNC: 237 MG/DL
EGFR: 107 ML/MIN/1.73M2
ESTIMATED AVERAGE GLUCOSE: 169 MG/DL
FOLATE SERPL-MCNC: 11.1 NG/ML
GLUCOSE SERPL-MCNC: 132 MG/DL
HBA1C MFR BLD HPLC: 7.5 %
HDLC SERPL-MCNC: 27 MG/DL
LDLC SERPL CALC-MCNC: 47 MG/DL
MICROALBUMIN 24H UR DL<=1MG/L-MCNC: 222.4 MG/DL
MICROALBUMIN/CREAT 24H UR-RTO: 939 MG/G
NONHDLC SERPL-MCNC: 125 MG/DL
POTASSIUM SERPL-SCNC: 4.8 MMOL/L
PROT SERPL-MCNC: 7.2 G/DL
SODIUM SERPL-SCNC: 138 MMOL/L
TRIGL SERPL-MCNC: 390 MG/DL
TSH SERPL-ACNC: 1.21 UIU/ML
VIT B12 SERPL-MCNC: 742 PG/ML

## 2023-05-19 ENCOUNTER — APPOINTMENT (OUTPATIENT)
Dept: INTERNAL MEDICINE | Facility: CLINIC | Age: 58
End: 2023-05-19

## 2023-05-22 ENCOUNTER — APPOINTMENT (OUTPATIENT)
Dept: NEUROLOGY | Facility: CLINIC | Age: 58
End: 2023-05-22
Payer: MEDICAID

## 2023-05-22 VITALS
SYSTOLIC BLOOD PRESSURE: 126 MMHG | DIASTOLIC BLOOD PRESSURE: 79 MMHG | BODY MASS INDEX: 34.93 KG/M2 | WEIGHT: 244 LBS | HEART RATE: 97 BPM | HEIGHT: 70 IN

## 2023-05-22 DIAGNOSIS — E11.40 TYPE 2 DIABETES MELLITUS WITH DIABETIC NEUROPATHY, UNSPECIFIED: ICD-10-CM

## 2023-05-22 PROCEDURE — 99205 OFFICE O/P NEW HI 60 MIN: CPT

## 2023-05-22 RX ORDER — PREGABALIN 150 MG/1
150 CAPSULE ORAL
Qty: 30 | Refills: 5 | Status: ACTIVE | COMMUNITY
Start: 2023-05-22 | End: 1900-01-01

## 2023-05-22 NOTE — DISCUSSION/SUMMARY
[FreeTextEntry1] : 57-year-old gentleman who is here for initial consultation of diabetic neuropathy that is going on leg behind his hemoglobin A1c normalization.  Patient was advised the best treatment for the neuropathy will be better glucose control.  Will send Lyrica generic 300 mg twice a day to the pharmacy.  Patient will follow-up with me in 5 months.\par \par I spent the time noted on the day of this patient encounter preparing for, providing and documenting the above E/M service and counseling and educate patient on differential, workup, disease course, and treatment/management. Education was provided to the patient during this encounter. All questions and concerns were answered and addressed in detail. The patient verbalized understanding and agreed to plan. Patient was advised to continue to monitor for neurologic symptoms and to notify my office or go to the nearest emergency room if there are any changes. Any orders/referrals and communications were provided as well. \par Side effects of the above medications were discussed in detail including but not limited to applicable black box warning and teratogenicity as appropriate. \par Patient was advised to bring previous records to my office. \par \par \par

## 2023-05-22 NOTE — HISTORY OF PRESENT ILLNESS
[FreeTextEntry1] : 57-year-old gentleman who is here for initial consultation of diabetes for the past 5 years.  Patient describes numbness and tingling in her feet from the toes to the mid calf bilaterally his hands.  Patient's hemoglobin A1c recently is 7.5 however previously in April 2022 it was 9.5 and previously it was even higher than that.  Patient was seeing a Dr. Love in [place] previously but because of insurance changes needed to find another neurologist.  Patient was tried on gabapentin 800 mg twice a day but it did not help.  Patient was then placed on Lyrica generic 300 mg twice a day which helped his symptoms.

## 2023-05-22 NOTE — PHYSICAL EXAM
[General Appearance - Alert] : alert [Oriented To Time, Place, And Person] : oriented to person, place, and time [Person] : oriented to person [Place] : oriented to place [Time] : oriented to time [Short Term Intact] : short term memory intact [Fluency] : fluency intact [Current Events] : adequate knowledge of current events [Cranial Nerves Optic (II)] : visual acuity intact bilaterally,  visual fields full to confrontation, pupils equal round and reactive to light [Cranial Nerves Oculomotor (III)] : extraocular motion intact [Cranial Nerves Trigeminal (V)] : facial sensation intact symmetrically [Cranial Nerves Facial (VII)] : face symmetrical [Cranial Nerves Vestibulocochlear (VIII)] : hearing was intact bilaterally [Cranial Nerves Accessory (XI - Cranial And Spinal)] : head turning and shoulder shrug symmetric [Motor Tone] : muscle tone was normal in all four extremities [Motor Strength] : muscle strength was normal in all four extremities [Dysesthesia] : dysesthesia was present [Hyperesthesia] : hyperesthesia was present [Abnormal Walk] : normal gait [Coordination - Dysmetria Impaired Finger-to-Nose Bilateral] : not present [1+] : Biceps left 1+ [0] : Patella left 0 [FreeTextEntry5] : Scar across the eyebrows

## 2023-06-09 ENCOUNTER — RX RENEWAL (OUTPATIENT)
Age: 58
End: 2023-06-09

## 2023-06-09 RX ORDER — LISINOPRIL 10 MG/1
10 TABLET ORAL
Qty: 90 | Refills: 0 | Status: ACTIVE | COMMUNITY
Start: 2019-12-02 | End: 1900-01-01

## 2023-07-11 ENCOUNTER — APPOINTMENT (OUTPATIENT)
Dept: ENDOCRINOLOGY | Facility: CLINIC | Age: 58
End: 2023-07-11

## 2023-07-13 ENCOUNTER — APPOINTMENT (OUTPATIENT)
Dept: CARDIOLOGY | Facility: CLINIC | Age: 58
End: 2023-07-13
Payer: MEDICAID

## 2023-07-13 ENCOUNTER — APPOINTMENT (OUTPATIENT)
Dept: INTERNAL MEDICINE | Facility: CLINIC | Age: 58
End: 2023-07-13
Payer: MEDICAID

## 2023-07-13 DIAGNOSIS — R94.30 ABNORMAL RESULT OF CARDIOVASCULAR FUNCTION STUDY, UNSPECIFIED: ICD-10-CM

## 2023-07-13 PROCEDURE — 93015 CV STRESS TEST SUPVJ I&R: CPT

## 2023-07-13 PROCEDURE — 99213 OFFICE O/P EST LOW 20 MIN: CPT | Mod: 25

## 2023-07-13 PROCEDURE — 93306 TTE W/DOPPLER COMPLETE: CPT

## 2023-09-26 ENCOUNTER — APPOINTMENT (OUTPATIENT)
Dept: NEUROLOGY | Facility: CLINIC | Age: 58
End: 2023-09-26

## 2023-09-28 ENCOUNTER — APPOINTMENT (OUTPATIENT)
Dept: ENDOCRINOLOGY | Facility: CLINIC | Age: 58
End: 2023-09-28

## 2023-12-31 PROBLEM — Z23 NEED FOR 23-POLYVALENT PNEUMOCOCCAL POLYSACCHARIDE VACCINE: Status: RESOLVED | Noted: 2018-08-19 | Resolved: 2023-12-31

## 2024-01-24 ENCOUNTER — APPOINTMENT (OUTPATIENT)
Dept: ENDOCRINOLOGY | Facility: CLINIC | Age: 59
End: 2024-01-24

## 2024-04-11 ENCOUNTER — APPOINTMENT (OUTPATIENT)
Dept: ENDOCRINOLOGY | Facility: CLINIC | Age: 59
End: 2024-04-11

## 2024-07-09 NOTE — PHYSICAL THERAPY INITIAL EVALUATION ADULT - THERAPY FREQUENCY, PT EVAL
Who is calling? Patient    Reason for call?  Returning MD's call    Is this call Urgent  No    Call back phone number [phone]  Patient states she was on the all with MD, however had to disconnect the call because she was at grocery store. Patient wants MD to call her back. Thanks.    
3-5x/week

## 2024-07-24 ENCOUNTER — APPOINTMENT (OUTPATIENT)
Dept: ENDOCRINOLOGY | Facility: CLINIC | Age: 59
End: 2024-07-24

## 2024-08-30 ENCOUNTER — LABORATORY RESULT (OUTPATIENT)
Age: 59
End: 2024-08-30

## 2024-08-30 ENCOUNTER — APPOINTMENT (OUTPATIENT)
Dept: INTERNAL MEDICINE | Facility: CLINIC | Age: 59
End: 2024-08-30

## 2024-08-30 VITALS
SYSTOLIC BLOOD PRESSURE: 128 MMHG | DIASTOLIC BLOOD PRESSURE: 76 MMHG | HEIGHT: 70 IN | BODY MASS INDEX: 33.5 KG/M2 | RESPIRATION RATE: 16 BRPM | HEART RATE: 93 BPM | WEIGHT: 234 LBS | OXYGEN SATURATION: 98 %

## 2024-08-30 DIAGNOSIS — M79.606 PAIN IN LEG, UNSPECIFIED: ICD-10-CM

## 2024-08-30 DIAGNOSIS — F17.210 NICOTINE DEPENDENCE, CIGARETTES, UNCOMPLICATED: ICD-10-CM

## 2024-08-30 DIAGNOSIS — E11.21 TYPE 2 DIABETES MELLITUS WITH DIABETIC NEPHROPATHY: ICD-10-CM

## 2024-08-30 DIAGNOSIS — E11.40 TYPE 2 DIABETES MELLITUS WITH DIABETIC NEUROPATHY, UNSPECIFIED: ICD-10-CM

## 2024-08-30 DIAGNOSIS — M54.30 SCIATICA, UNSPECIFIED SIDE: ICD-10-CM

## 2024-08-30 DIAGNOSIS — E78.5 HYPERLIPIDEMIA, UNSPECIFIED: ICD-10-CM

## 2024-08-30 DIAGNOSIS — Z23 ENCOUNTER FOR IMMUNIZATION: ICD-10-CM

## 2024-08-30 DIAGNOSIS — N52.9 MALE ERECTILE DYSFUNCTION, UNSPECIFIED: ICD-10-CM

## 2024-08-30 DIAGNOSIS — E66.9 OBESITY, UNSPECIFIED: ICD-10-CM

## 2024-08-30 DIAGNOSIS — K21.9 GASTRO-ESOPHAGEAL REFLUX DISEASE W/OUT ESOPHAGITIS: ICD-10-CM

## 2024-08-30 DIAGNOSIS — G89.29 PAIN IN LEG, UNSPECIFIED: ICD-10-CM

## 2024-08-30 DIAGNOSIS — E11.65 TYPE 2 DIABETES MELLITUS WITH HYPERGLYCEMIA: ICD-10-CM

## 2024-08-30 PROCEDURE — 99406 BEHAV CHNG SMOKING 3-10 MIN: CPT | Mod: 25

## 2024-08-30 PROCEDURE — 99214 OFFICE O/P EST MOD 30 MIN: CPT | Mod: 25

## 2024-08-30 PROCEDURE — 90686 IIV4 VACC NO PRSV 0.5 ML IM: CPT

## 2024-08-30 PROCEDURE — G0008: CPT

## 2024-08-30 RX ORDER — ZOSTER VACCINE RECOMBINANT, ADJUVANTED 50 MCG/0.5
50 KIT INTRAMUSCULAR DAILY
Qty: 1 | Refills: 1 | Status: ACTIVE | OUTPATIENT
Start: 2024-08-30

## 2024-08-30 NOTE — COUNSELING
[Cessation strategies including cessation program discussed] : Cessation strategies including cessation program discussed [Use of nicotine replacement therapies and other medications discussed] : Use of nicotine replacement therapies and other medications discussed [Encouraged to pick a quit date and identify support needed to quit] : Encouraged to pick a quit date and identify support needed to quit [Yes] : Willing to quit smoking [Potential consequences of obesity discussed] : Potential consequences of obesity discussed [Benefits of weight loss discussed] : Benefits of weight loss discussed [Encouraged to increase physical activity] : Encouraged to increase physical activity [Decrease Portions] : decrease portions [Patient Non-adherent to care plan] : Patient non-adherent to care plan [Needs reinforcement, provided] : Patient needs reinforcement on understanding of lifestyle changes and steps needed to achieve self management goal; reinforcement was provided [FreeTextEntry1] : 4

## 2024-08-30 NOTE — REVIEW OF SYSTEMS
[Fever] : fever [Chills] : chills [Fatigue] : fatigue [Night Sweats] : night sweats [Nasal Discharge] : nasal discharge [Sore Throat] : sore throat [Shortness Of Breath] : shortness of breath [Cough] : cough [Nocturia] : nocturia [Impotence] : impotence [Headache] : headache [Negative] : Heme/Lymph [Hot Flashes] : no hot flashes [Recent Change In Weight] : ~T no recent weight change [Earache] : no earache [Hearing Loss] : no hearing loss [Nosebleeds] : no nosebleeds [Postnasal Drip] : no postnasal drip [Hoarseness] : no hoarseness [Wheezing] : no wheezing [Dyspnea on Exertion] : not dyspnea on exertion [Abdominal Pain] : no abdominal pain [Nausea] : no nausea [Constipation] : no constipation [Diarrhea] : no diarrhea [Vomiting] : no vomiting [Heartburn] : no heartburn [Melena] : no melena [Dysuria] : no dysuria [Incontinence] : no incontinence [Hematuria] : no hematuria [Frequency] : no frequency [Itching] : no itching [Skin Rash] : no skin rash [Suicidal] : not suicidal [Insomnia] : no insomnia [Anxiety] : no anxiety [Depression] : no depression [FreeTextEntry9] : back and leg pain, hand pain, neck and shoulder pains [de-identified] : numbness as noted - neuropathy

## 2024-08-30 NOTE — PHYSICAL EXAM
[No Acute Distress] : no acute distress [Well Nourished] : well nourished [Well Developed] : well developed [Well-Appearing] : well-appearing [Normal Voice/Communication] : normal voice/communication [Normal Sclera/Conjunctiva] : normal sclera/conjunctiva [PERRL] : pupils equal round and reactive to light [EOMI] : extraocular movements intact [Normal Outer Ear/Nose] : the outer ears and nose were normal in appearance [No JVD] : no jugular venous distention [No Lymphadenopathy] : no lymphadenopathy [Supple] : supple [Thyroid Normal, No Nodules] : the thyroid was normal and there were no nodules present [No Respiratory Distress] : no respiratory distress  [No Accessory Muscle Use] : no accessory muscle use [Clear to Auscultation] : lungs were clear to auscultation bilaterally [Normal Rate] : normal rate  [Regular Rhythm] : with a regular rhythm [Normal S1, S2] : normal S1 and S2 [No Murmur] : no murmur heard [No Carotid Bruits] : no carotid bruits [No Abdominal Bruit] : a ~M bruit was not heard ~T in the abdomen [Pedal Pulses Present] : the pedal pulses are present [No Palpable Aorta] : no palpable aorta [Soft] : abdomen soft [Non Tender] : non-tender [Non-distended] : non-distended [No Masses] : no abdominal mass palpated [No HSM] : no HSM [Normal Bowel Sounds] : normal bowel sounds [Normal Posterior Cervical Nodes] : no posterior cervical lymphadenopathy [Normal Anterior Cervical Nodes] : no anterior cervical lymphadenopathy [No CVA Tenderness] : no CVA  tenderness [No Spinal Tenderness] : no spinal tenderness [No Joint Swelling] : no joint swelling [Grossly Normal Strength/Tone] : grossly normal strength/tone [Coordination Grossly Intact] : coordination grossly intact [No Focal Deficits] : no focal deficits [Normal Gait] : normal gait [Speech Grossly Normal] : speech grossly normal [Memory Grossly Normal] : memory grossly normal [Normal Affect] : the affect was normal [Alert and Oriented x3] : oriented to person, place, and time [Normal Mood] : the mood was normal [Normal Insight/Judgement] : insight and judgment were intact [de-identified] : obese [de-identified] : min tr edema [de-identified] : with pod

## 2024-08-30 NOTE — HEALTH RISK ASSESSMENT
[Yes] : Yes [Monthly or less (1 pt)] : Monthly or less (1 point) [1 or 2 (0 pts)] : 1 or 2 (0 points) [Never (0 pts)] : Never (0 points) [No] : In the past 12 months have you used drugs other than those required for medical reasons? No [0] : 2) Feeling down, depressed, or hopeless: Not at all (0) [PHQ-2 Negative - No further assessment needed] : PHQ-2 Negative - No further assessment needed [Current] : Current [Audit-CScore] : 1 [[id number]] : 0

## 2024-08-30 NOTE — ASSESSMENT
[FreeTextEntry1] : asking to restart all of his meds.  Rx d/w pt and sent.  d/w pt the importance of continuing rx as prescribed and not stopping.  discussed the importance of following up regularly.

## 2024-08-30 NOTE — HISTORY OF PRESENT ILLNESS
[FreeTextEntry1] : DM, HLD, neuropathy, ED, leg and back pain [de-identified] : Last seen 1.5 years ago.  Has since seen endo, cardiology and neurology, notes and labs reviewed.   Reports that he lost his insurance for a while.  Pt is a 59 y/o male with a hx of DM, hld, neuropathy, ED, leg and back pain.  He has been on medications for the DM, lipid meds On lyrica for the neuropathy.  Has followed up with Dr Villalpando and with diabetic educator.  last seen > 1 year ago.  Meds were adjusted. Ran out of meds - had insurance lapse.  Has been taking only insulin.   diet worsened Has not been riding bicycle. some wt loss FS - has dexcom - reported as variable, not controlled.   due to f/u with ophtho.   Sleeping has been okay - variable.  Still with neuropathic sx. follows with Neurology - Dr Manish Holguin, also follows with pod- Dr Alicia - Reports that the neuropathic sx include pain at the leg and numbness at the toes. + burning at the feet and the back with some pinching. Pain at the toes has increased. Had no relief with gabapentin - controlled with the lyrica Some pinching/numbness sensation at the hands - s/p EMG. in the past had been given oxy by other MD. Reports that he has continued to follow. Saw Dr Weaver - has been out of meds as noted.    + still with pain at the back. s/p injection with relief. Has been increasing.  + polyuria, with night time polydipsia - with nocturia x3. no polyphagia.  no noted CV sx.  Had stress and nl echo with cardiology. no GI sx. Has been having occ GERD sx.  no noted dizziness. no confusion. Flank and abd have been fine Reports some feeling of le edema in the afternoon. follows with cardiology.  with pain/pinching with itching at the back.   Has been smoking a little. Reducing slowly - tapering down to off.  continues to reduce  Tdap - '17 pneumo ?? get flu vaccine  ophtho - due - Dr Ocasio - Reports having followed up since last time.  colon - fit 5/21 - due prost- 12/19

## 2024-08-31 ENCOUNTER — RX RENEWAL (OUTPATIENT)
Age: 59
End: 2024-08-31

## 2024-08-31 LAB
25(OH)D3 SERPL-MCNC: 11.7 NG/ML
ALBUMIN SERPL ELPH-MCNC: 4.1 G/DL
ALP BLD-CCNC: 83 U/L
ALT SERPL-CCNC: 27 U/L
ANION GAP SERPL CALC-SCNC: 16 MMOL/L
APPEARANCE: CLEAR
AST SERPL-CCNC: 20 U/L
BACTERIA: NEGATIVE /HPF
BASOPHILS # BLD AUTO: 0.08 K/UL
BASOPHILS NFR BLD AUTO: 1 %
BILIRUB SERPL-MCNC: 0.5 MG/DL
BILIRUBIN URINE: NEGATIVE
BLOOD URINE: NEGATIVE
BUN SERPL-MCNC: 20 MG/DL
CALCIUM SERPL-MCNC: 9.1 MG/DL
CAST: 2 /LPF
CHLORIDE SERPL-SCNC: 103 MMOL/L
CHOLEST SERPL-MCNC: 208 MG/DL
CO2 SERPL-SCNC: 22 MMOL/L
COLOR: NORMAL
CREAT SERPL-MCNC: 0.91 MG/DL
CREAT SPEC-SCNC: 185 MG/DL
EGFR: 99 ML/MIN/1.73M2
EOSINOPHIL # BLD AUTO: 0.41 K/UL
EOSINOPHIL NFR BLD AUTO: 4.9 %
EPITHELIAL CELLS: 2 /HPF
ESTIMATED AVERAGE GLUCOSE: 258 MG/DL
FOLATE SERPL-MCNC: 9.1 NG/ML
FRUCTOSAMINE SERPL-MCNC: 362 UMOL/L
GLUCOSE QUALITATIVE U: >=1000 MG/DL
GLUCOSE SERPL-MCNC: 99 MG/DL
HBA1C MFR BLD HPLC: 10.6 %
HCT VFR BLD CALC: 48 %
HDLC SERPL-MCNC: 18 MG/DL
HGB BLD-MCNC: 15.8 G/DL
IMM GRANULOCYTES NFR BLD AUTO: 0.4 %
KETONES URINE: NEGATIVE MG/DL
LDLC SERPL CALC-MCNC: NORMAL MG/DL
LEUKOCYTE ESTERASE URINE: NEGATIVE
LYMPHOCYTES # BLD AUTO: 1.3 K/UL
LYMPHOCYTES NFR BLD AUTO: 15.6 %
MAN DIFF?: NORMAL
MCHC RBC-ENTMCNC: 30.1 PG
MCHC RBC-ENTMCNC: 32.9 GM/DL
MCV RBC AUTO: 91.4 FL
MICROALBUMIN 24H UR DL<=1MG/L-MCNC: 200.3 MG/DL
MICROALBUMIN/CREAT 24H UR-RTO: 1081 MG/G
MICROSCOPIC-UA: NORMAL
MONOCYTES # BLD AUTO: 0.56 K/UL
MONOCYTES NFR BLD AUTO: 6.7 %
NEUTROPHILS # BLD AUTO: 5.93 K/UL
NEUTROPHILS NFR BLD AUTO: 71.4 %
NITRITE URINE: NEGATIVE
NONHDLC SERPL-MCNC: 191 MG/DL
PH URINE: 5.5
PLATELET # BLD AUTO: 218 K/UL
POTASSIUM SERPL-SCNC: 4.4 MMOL/L
PROT SERPL-MCNC: 6.3 G/DL
PROTEIN URINE: 300 MG/DL
RBC # BLD: 5.25 M/UL
RBC # FLD: 14 %
RED BLOOD CELLS URINE: NORMAL /HPF
REVIEW: NORMAL
SODIUM SERPL-SCNC: 141 MMOL/L
SPECIFIC GRAVITY URINE: 1.03
TRIGL SERPL-MCNC: 1365 MG/DL
TSH SERPL-ACNC: 1.3 UIU/ML
UROBILINOGEN URINE: 0.2 MG/DL
VIT B12 SERPL-MCNC: 712 PG/ML
WBC # FLD AUTO: 8.31 K/UL
WHITE BLOOD CELLS URINE: 1 /HPF

## 2024-08-31 RX ORDER — SEMAGLUTIDE 0.68 MG/ML
2 INJECTION, SOLUTION SUBCUTANEOUS
Qty: 1 | Refills: 0 | Status: ACTIVE | COMMUNITY
Start: 2024-08-31 | End: 1900-01-01

## 2024-09-24 ENCOUNTER — RX CHANGE (OUTPATIENT)
Age: 59
End: 2024-09-24

## 2024-09-24 RX ORDER — METFORMIN HYDROCHLORIDE 1000 MG/1
1000 TABLET, COATED ORAL
Qty: 180 | Refills: 1 | Status: ACTIVE | COMMUNITY
Start: 1900-01-01 | End: 1900-01-01

## 2024-09-25 NOTE — PROGRESS NOTE ADULT - SUBJECTIVE AND OBJECTIVE BOX
Patient is a 51y old  Male who presents with a chief complaint of right foot pain (11 Aug 2017 10:57)       INTERVAL HPI/OVERNIGHT EVENTS:  Patient seen and evaluated at bedside.  Pt is resting comfortable. Relates pain overnight that was well controlled with percocet.    Allergies    No Known Allergies    Intolerances        Vital Signs Last 24 Hrs  T(C): 36.8 (12 Aug 2017 07:30), Max: 37.1 (11 Aug 2017 13:30)  T(F): 98.2 (12 Aug 2017 07:30), Max: 982 (11 Aug 2017 18:57)  HR: 79 (12 Aug 2017 07:30) (79 - 97)  BP: 121/90 (12 Aug 2017 07:30) (109/66 - 147/85)  BP(mean): --  RR: 14 (12 Aug 2017 07:30) (12 - 18)  SpO2: 98% (12 Aug 2017 07:30) (95% - 98%)    LABS:                        14.1   7.8   )-----------( 164      ( 12 Aug 2017 06:41 )             42.9     08-12    137  |  102  |  11  ----------------------------<  223<H>  3.8   |  27  |  0.44<L>    Ca    7.8<L>      12 Aug 2017 06:41    TPro  6.5  /  Alb  3.2<L>  /  TBili  0.5  /  DBili  x   /  AST  25  /  ALT  41  /  AlkPhos  85  08-12    PT/INR - ( 11 Aug 2017 08:30 )   PT: 10.1 sec;   INR: 0.93 ratio         PTT - ( 11 Aug 2017 08:30 )  PTT:31.5 sec    CAPILLARY BLOOD GLUCOSE  219 (11 Aug 2017 14:34)  251 (11 Aug 2017 13:30)  251 (11 Aug 2017 12:10)  261 (11 Aug 2017 11:54)  288 (11 Aug 2017 10:06)          Lower Extremity Physical Exam:  RF BK cast intact. normal cap fill time to toes, pt able to move toes.    RADIOLOGY & ADDITIONAL TESTS: [Follow-Up Visit] : a follow-up visit for [Spouse] : spouse [FreeTextEntry2] : left THR recurrent dislocation

## 2024-10-31 ENCOUNTER — RX RENEWAL (OUTPATIENT)
Age: 59
End: 2024-10-31

## 2024-11-08 ENCOUNTER — APPOINTMENT (OUTPATIENT)
Dept: INTERNAL MEDICINE | Facility: CLINIC | Age: 59
End: 2024-11-08
Payer: MEDICAID

## 2024-11-08 VITALS
OXYGEN SATURATION: 98 % | SYSTOLIC BLOOD PRESSURE: 116 MMHG | BODY MASS INDEX: 32.35 KG/M2 | RESPIRATION RATE: 16 BRPM | DIASTOLIC BLOOD PRESSURE: 70 MMHG | TEMPERATURE: 98.5 F | HEART RATE: 101 BPM | WEIGHT: 226 LBS | HEIGHT: 70 IN

## 2024-11-08 DIAGNOSIS — Z87.891 PERSONAL HISTORY OF NICOTINE DEPENDENCE: ICD-10-CM

## 2024-11-08 DIAGNOSIS — E11.21 TYPE 2 DIABETES MELLITUS WITH DIABETIC NEPHROPATHY: ICD-10-CM

## 2024-11-08 DIAGNOSIS — G89.29 PAIN IN LEG, UNSPECIFIED: ICD-10-CM

## 2024-11-08 DIAGNOSIS — M54.30 SCIATICA, UNSPECIFIED SIDE: ICD-10-CM

## 2024-11-08 DIAGNOSIS — N52.9 MALE ERECTILE DYSFUNCTION, UNSPECIFIED: ICD-10-CM

## 2024-11-08 DIAGNOSIS — M79.606 PAIN IN LEG, UNSPECIFIED: ICD-10-CM

## 2024-11-08 DIAGNOSIS — K21.9 GASTRO-ESOPHAGEAL REFLUX DISEASE W/OUT ESOPHAGITIS: ICD-10-CM

## 2024-11-08 DIAGNOSIS — E66.9 OBESITY, UNSPECIFIED: ICD-10-CM

## 2024-11-08 DIAGNOSIS — E78.5 HYPERLIPIDEMIA, UNSPECIFIED: ICD-10-CM

## 2024-11-08 DIAGNOSIS — Z00.00 ENCOUNTER FOR GENERAL ADULT MEDICAL EXAMINATION W/OUT ABNORMAL FINDINGS: ICD-10-CM

## 2024-11-08 DIAGNOSIS — E11.65 TYPE 2 DIABETES MELLITUS WITH HYPERGLYCEMIA: ICD-10-CM

## 2024-11-08 DIAGNOSIS — E11.40 TYPE 2 DIABETES MELLITUS WITH DIABETIC NEUROPATHY, UNSPECIFIED: ICD-10-CM

## 2024-11-08 PROCEDURE — 99214 OFFICE O/P EST MOD 30 MIN: CPT | Mod: 25

## 2024-11-09 LAB
ALBUMIN SERPL ELPH-MCNC: 4.5 G/DL
ALP BLD-CCNC: 65 U/L
ALT SERPL-CCNC: 23 U/L
ANION GAP SERPL CALC-SCNC: 13 MMOL/L
APPEARANCE: CLEAR
AST SERPL-CCNC: 17 U/L
BACTERIA: NEGATIVE /HPF
BASOPHILS # BLD AUTO: 0.07 K/UL
BASOPHILS NFR BLD AUTO: 1 %
BILIRUB SERPL-MCNC: 0.8 MG/DL
BILIRUBIN URINE: ABNORMAL
BLOOD URINE: ABNORMAL
BUN SERPL-MCNC: 14 MG/DL
CALCIUM SERPL-MCNC: 9.6 MG/DL
CAST: 1 /LPF
CHLORIDE SERPL-SCNC: 102 MMOL/L
CHOLEST SERPL-MCNC: 117 MG/DL
CO2 SERPL-SCNC: 25 MMOL/L
COLOR: NORMAL
CREAT SERPL-MCNC: 0.74 MG/DL
CREAT SPEC-SCNC: 215 MG/DL
EGFR: 106 ML/MIN/1.73M2
EOSINOPHIL # BLD AUTO: 0.3 K/UL
EOSINOPHIL NFR BLD AUTO: 4.3 %
EPITHELIAL CELLS: 1 /HPF
ESTIMATED AVERAGE GLUCOSE: 146 MG/DL
FOLATE SERPL-MCNC: 12.4 NG/ML
FRUCTOSAMINE SERPL-MCNC: 234 UMOL/L
GLUCOSE QUALITATIVE U: NEGATIVE MG/DL
GLUCOSE SERPL-MCNC: 129 MG/DL
HBA1C MFR BLD HPLC: 6.7 %
HCT VFR BLD CALC: 47.9 %
HDLC SERPL-MCNC: 26 MG/DL
HGB BLD-MCNC: 15.9 G/DL
IMM GRANULOCYTES NFR BLD AUTO: 0.1 %
KETONES URINE: NEGATIVE MG/DL
LDLC SERPL CALC-MCNC: 38 MG/DL
LEUKOCYTE ESTERASE URINE: ABNORMAL
LYMPHOCYTES # BLD AUTO: 1.37 K/UL
LYMPHOCYTES NFR BLD AUTO: 19.5 %
MAN DIFF?: NORMAL
MCHC RBC-ENTMCNC: 29.1 PG
MCHC RBC-ENTMCNC: 33.2 G/DL
MCV RBC AUTO: 87.7 FL
MICROALBUMIN 24H UR DL<=1MG/L-MCNC: 260.4 MG/DL
MICROALBUMIN/CREAT 24H UR-RTO: 1214 MG/G
MICROSCOPIC-UA: NORMAL
MONOCYTES # BLD AUTO: 0.48 K/UL
MONOCYTES NFR BLD AUTO: 6.8 %
NEUTROPHILS # BLD AUTO: 4.8 K/UL
NEUTROPHILS NFR BLD AUTO: 68.3 %
NITRITE URINE: NEGATIVE
NONHDLC SERPL-MCNC: 91 MG/DL
PH URINE: 5.5
PLATELET # BLD AUTO: 239 K/UL
POTASSIUM SERPL-SCNC: 4.7 MMOL/L
PROT SERPL-MCNC: 7 G/DL
PROTEIN URINE: 300 MG/DL
PSA SERPL-MCNC: 0.65 NG/ML
RBC # BLD: 5.46 M/UL
RBC # FLD: 13.9 %
RED BLOOD CELLS URINE: 9 /HPF
SODIUM SERPL-SCNC: 140 MMOL/L
SPECIFIC GRAVITY URINE: 1.02
TRIGL SERPL-MCNC: 358 MG/DL
TSH SERPL-ACNC: 1.42 UIU/ML
UROBILINOGEN URINE: 1 MG/DL
VIT B12 SERPL-MCNC: 796 PG/ML
WBC # FLD AUTO: 7.03 K/UL
WHITE BLOOD CELLS URINE: 1 /HPF

## 2024-11-18 ENCOUNTER — APPOINTMENT (OUTPATIENT)
Dept: INTERNAL MEDICINE | Facility: CLINIC | Age: 59
End: 2024-11-18

## 2025-01-03 ENCOUNTER — APPOINTMENT (OUTPATIENT)
Dept: ENDOCRINOLOGY | Facility: CLINIC | Age: 60
End: 2025-01-03
Payer: MEDICAID

## 2025-01-03 VITALS
RESPIRATION RATE: 16 BRPM | WEIGHT: 237 LBS | OXYGEN SATURATION: 98 % | SYSTOLIC BLOOD PRESSURE: 133 MMHG | HEART RATE: 107 BPM | HEIGHT: 70 IN | BODY MASS INDEX: 33.93 KG/M2 | DIASTOLIC BLOOD PRESSURE: 84 MMHG

## 2025-01-03 DIAGNOSIS — E66.9 OBESITY, UNSPECIFIED: ICD-10-CM

## 2025-01-03 DIAGNOSIS — E11.65 TYPE 2 DIABETES MELLITUS WITH HYPERGLYCEMIA: ICD-10-CM

## 2025-01-03 DIAGNOSIS — E78.5 HYPERLIPIDEMIA, UNSPECIFIED: ICD-10-CM

## 2025-01-03 PROCEDURE — 99214 OFFICE O/P EST MOD 30 MIN: CPT | Mod: 25

## 2025-01-03 PROCEDURE — 82962 GLUCOSE BLOOD TEST: CPT

## 2025-01-03 PROCEDURE — 95251 CONT GLUC MNTR ANALYSIS I&R: CPT

## 2025-01-03 RX ORDER — SEMAGLUTIDE 2.68 MG/ML
8 INJECTION, SOLUTION SUBCUTANEOUS
Qty: 1 | Refills: 5 | Status: ACTIVE | COMMUNITY
Start: 2025-01-03 | End: 1900-01-01

## 2025-01-03 RX ORDER — EMPAGLIFLOZIN 25 MG/1
25 TABLET, FILM COATED ORAL DAILY
Qty: 1 | Refills: 3 | Status: ACTIVE | COMMUNITY
Start: 2025-01-03 | End: 1900-01-01

## 2025-01-03 RX ORDER — BLOOD-GLUCOSE SENSOR
EACH MISCELLANEOUS
Qty: 9 | Refills: 3 | Status: ACTIVE | COMMUNITY
Start: 2025-01-03 | End: 1900-01-01

## 2025-02-11 ENCOUNTER — APPOINTMENT (OUTPATIENT)
Dept: INTERNAL MEDICINE | Facility: CLINIC | Age: 60
End: 2025-02-11

## 2025-03-26 ENCOUNTER — APPOINTMENT (OUTPATIENT)
Dept: CARDIOLOGY | Facility: CLINIC | Age: 60
End: 2025-03-26
Payer: MEDICAID

## 2025-03-26 ENCOUNTER — NON-APPOINTMENT (OUTPATIENT)
Age: 60
End: 2025-03-26

## 2025-03-26 VITALS
BODY MASS INDEX: 35.07 KG/M2 | SYSTOLIC BLOOD PRESSURE: 120 MMHG | HEART RATE: 88 BPM | WEIGHT: 245 LBS | HEIGHT: 70 IN | OXYGEN SATURATION: 97 % | DIASTOLIC BLOOD PRESSURE: 68 MMHG

## 2025-03-26 DIAGNOSIS — R94.30 ABNORMAL RESULT OF CARDIOVASCULAR FUNCTION STUDY, UNSPECIFIED: ICD-10-CM

## 2025-03-26 DIAGNOSIS — I10 ESSENTIAL (PRIMARY) HYPERTENSION: ICD-10-CM

## 2025-03-26 DIAGNOSIS — I51.9 HEART DISEASE, UNSPECIFIED: ICD-10-CM

## 2025-03-26 DIAGNOSIS — E78.5 HYPERLIPIDEMIA, UNSPECIFIED: ICD-10-CM

## 2025-03-26 PROCEDURE — 99214 OFFICE O/P EST MOD 30 MIN: CPT | Mod: 25

## 2025-03-26 PROCEDURE — 93000 ELECTROCARDIOGRAM COMPLETE: CPT

## 2025-04-03 ENCOUNTER — APPOINTMENT (OUTPATIENT)
Dept: ENDOCRINOLOGY | Facility: CLINIC | Age: 60
End: 2025-04-03

## 2025-04-10 ENCOUNTER — OUTPATIENT (OUTPATIENT)
Dept: OUTPATIENT SERVICES | Facility: HOSPITAL | Age: 60
LOS: 1 days | End: 2025-04-10
Payer: MEDICAID

## 2025-04-10 ENCOUNTER — APPOINTMENT (OUTPATIENT)
Dept: CT IMAGING | Facility: IMAGING CENTER | Age: 60
End: 2025-04-10
Payer: MEDICAID

## 2025-04-10 DIAGNOSIS — Z98.890 OTHER SPECIFIED POSTPROCEDURAL STATES: Chronic | ICD-10-CM

## 2025-04-10 DIAGNOSIS — R94.30 ABNORMAL RESULT OF CARDIOVASCULAR FUNCTION STUDY, UNSPECIFIED: ICD-10-CM

## 2025-04-10 PROCEDURE — 75574 CT ANGIO HRT W/3D IMAGE: CPT | Mod: 26

## 2025-04-10 PROCEDURE — 75574 CT ANGIO HRT W/3D IMAGE: CPT

## 2025-06-24 ENCOUNTER — APPOINTMENT (OUTPATIENT)
Dept: ENDOCRINOLOGY | Facility: CLINIC | Age: 60
End: 2025-06-24

## 2025-06-24 ENCOUNTER — LABORATORY RESULT (OUTPATIENT)
Age: 60
End: 2025-06-24

## 2025-06-24 VITALS
HEART RATE: 87 BPM | HEIGHT: 70 IN | SYSTOLIC BLOOD PRESSURE: 144 MMHG | BODY MASS INDEX: 36.94 KG/M2 | RESPIRATION RATE: 16 BRPM | DIASTOLIC BLOOD PRESSURE: 80 MMHG | OXYGEN SATURATION: 95 % | WEIGHT: 258 LBS | TEMPERATURE: 97.8 F

## 2025-06-24 LAB — GLUCOSE BLDC GLUCOMTR-MCNC: 289

## 2025-06-24 PROCEDURE — 99214 OFFICE O/P EST MOD 30 MIN: CPT | Mod: 25

## 2025-06-24 PROCEDURE — 82962 GLUCOSE BLOOD TEST: CPT

## 2025-06-24 RX ORDER — PEN NEEDLE, DIABETIC 32 GX 1/4"
32G X 6 MM NEEDLE, DISPOSABLE MISCELLANEOUS
Qty: 400 | Refills: 3 | Status: ACTIVE | COMMUNITY
Start: 2025-06-24 | End: 1900-01-01

## 2025-06-24 RX ORDER — TIRZEPATIDE 5 MG/.5ML
5 INJECTION, SOLUTION SUBCUTANEOUS
Qty: 3 | Refills: 3 | Status: ACTIVE | COMMUNITY
Start: 2025-06-24 | End: 1900-01-01

## 2025-06-25 LAB
ALBUMIN SERPL ELPH-MCNC: 4 G/DL
ALP BLD-CCNC: 105 U/L
ALT SERPL-CCNC: 35 U/L
ANION GAP SERPL CALC-SCNC: 15 MMOL/L
APO B SERPL-MCNC: 71 MG/DL
AST SERPL-CCNC: 26 U/L
BASOPHILS # BLD AUTO: 0.05 K/UL
BASOPHILS NFR BLD AUTO: 0.8 %
BILIRUB SERPL-MCNC: 0.3 MG/DL
BUN SERPL-MCNC: 17 MG/DL
CALCIUM SERPL-MCNC: 9.2 MG/DL
CHLORIDE SERPL-SCNC: 100 MMOL/L
CHOLEST SERPL-MCNC: 192 MG/DL
CO2 SERPL-SCNC: 20 MMOL/L
CREAT SERPL-MCNC: 0.63 MG/DL
CREAT SPEC-SCNC: 73 MG/DL
EGFRCR SERPLBLD CKD-EPI 2021: 111 ML/MIN/1.73M2
EOSINOPHIL # BLD AUTO: 0.37 K/UL
EOSINOPHIL NFR BLD AUTO: 6.1 %
ESTIMATED AVERAGE GLUCOSE: 232 MG/DL
FOLATE SERPL-MCNC: 10.5 NG/ML
FRUCTOSAMINE SERPL-MCNC: 285 UMOL/L
GLUCOSE SERPL-MCNC: 269 MG/DL
GLYCOMARK.: 2.6 UG/ML
HBA1C MFR BLD HPLC: 9.7 %
HCT VFR BLD CALC: 46.3 %
HDLC SERPL-MCNC: 21 MG/DL
HGB BLD-MCNC: 14.6 G/DL
IMM GRANULOCYTES NFR BLD AUTO: 0.5 %
LDLC SERPL-MCNC: NORMAL MG/DL
LYMPHOCYTES # BLD AUTO: 1.06 K/UL
LYMPHOCYTES NFR BLD AUTO: 17.6 %
MAN DIFF?: NORMAL
MCHC RBC-ENTMCNC: 29.1 PG
MCHC RBC-ENTMCNC: 31.5 G/DL
MCV RBC AUTO: 92.2 FL
MICROALBUMIN 24H UR DL<=1MG/L-MCNC: 170.1 MG/DL
MICROALBUMIN/CREAT 24H UR-RTO: 2332 MG/G
MONOCYTES # BLD AUTO: 0.35 K/UL
MONOCYTES NFR BLD AUTO: 5.8 %
NEUTROPHILS # BLD AUTO: 4.17 K/UL
NEUTROPHILS NFR BLD AUTO: 69.2 %
NONHDLC SERPL-MCNC: 171 MG/DL
PLATELET # BLD AUTO: 210 K/UL
POTASSIUM SERPL-SCNC: 4.3 MMOL/L
PROT SERPL-MCNC: 6.6 G/DL
RBC # BLD: 5.02 M/UL
RBC # FLD: 15.9 %
SODIUM SERPL-SCNC: 135 MMOL/L
T4 FREE SERPL-MCNC: 1 NG/DL
TRIGL SERPL-MCNC: 999 MG/DL
TSH SERPL-ACNC: 1.71 UIU/ML
VIT B12 SERPL-MCNC: 557 PG/ML
WBC # FLD AUTO: 6.03 K/UL

## 2025-07-30 NOTE — H&P ADULT - PROBLEM SELECTOR PROBLEM 4
Detail Level: Simple
Instructions (Optional): May take multiple treatments to resolve.
Hyperlipidemia, unspecified hyperlipidemia type

## 2025-09-17 ENCOUNTER — APPOINTMENT (OUTPATIENT)
Dept: CARDIOLOGY | Facility: CLINIC | Age: 60
End: 2025-09-17
Payer: MEDICAID

## 2025-09-17 VITALS
SYSTOLIC BLOOD PRESSURE: 118 MMHG | WEIGHT: 263 LBS | OXYGEN SATURATION: 96 % | BODY MASS INDEX: 37.65 KG/M2 | HEART RATE: 92 BPM | HEIGHT: 70 IN | DIASTOLIC BLOOD PRESSURE: 70 MMHG

## 2025-09-17 DIAGNOSIS — I10 ESSENTIAL (PRIMARY) HYPERTENSION: ICD-10-CM

## 2025-09-17 DIAGNOSIS — E78.5 HYPERLIPIDEMIA, UNSPECIFIED: ICD-10-CM

## 2025-09-17 DIAGNOSIS — I51.9 HEART DISEASE, UNSPECIFIED: ICD-10-CM

## 2025-09-17 PROCEDURE — G2211 COMPLEX E/M VISIT ADD ON: CPT | Mod: NC

## 2025-09-17 PROCEDURE — 99214 OFFICE O/P EST MOD 30 MIN: CPT
